# Patient Record
Sex: FEMALE | Race: WHITE | NOT HISPANIC OR LATINO | Employment: UNEMPLOYED | URBAN - METROPOLITAN AREA
[De-identification: names, ages, dates, MRNs, and addresses within clinical notes are randomized per-mention and may not be internally consistent; named-entity substitution may affect disease eponyms.]

---

## 2017-11-01 ENCOUNTER — HOSPITAL ENCOUNTER (OUTPATIENT)
Dept: FAMILY MEDICINE CLINIC | Facility: CLINIC | Age: 52
Setting detail: SPECIMEN
Discharge: HOME | End: 2017-11-01
Attending: INTERNAL MEDICINE | Admitting: INTERNAL MEDICINE

## 2017-11-01 LAB
ALBUMIN SERPL-MCNC: 3.8 G/DL (ref 3.5–4.8)
ALBUMIN/GLOB SERPL: 1.1 {RATIO} (ref 1–1.7)
ALP SERPL-CCNC: 78 IU/L (ref 32–91)
ALT SERPL-CCNC: 22 IU/L (ref 14–54)
ANION GAP SERPL CALC-SCNC: 13.5 MMOL/L (ref 10–20)
AST SERPL-CCNC: 21 IU/L (ref 15–41)
BASOPHILS # BLD AUTO: 0.1 10*3/UL (ref 0–0.2)
BASOPHILS NFR BLD AUTO: 1 % (ref 0–2)
BILIRUB SERPL-MCNC: 0.6 MG/DL (ref 0.3–1.2)
BUN SERPL-MCNC: 10 MG/DL (ref 8–20)
BUN/CREAT SERPL: 20 (ref 5.4–26.2)
CALCIUM SERPL-MCNC: 9.3 MG/DL (ref 8.9–10.3)
CHLORIDE SERPL-SCNC: 99 MMOL/L (ref 101–111)
CHOLEST SERPL-MCNC: 149 MG/DL
CHOLEST/HDLC SERPL: 3.9 {RATIO}
CONV CO2: 28 MMOL/L (ref 22–32)
CONV LDL CHOLESTEROL DIRECT: 80 MG/DL (ref 0–100)
CONV TOTAL PROTEIN: 7.4 G/DL (ref 6.1–7.9)
CREAT UR-MCNC: 0.5 MG/DL (ref 0.4–1)
DIFFERENTIAL METHOD BLD: (no result)
EOSINOPHIL # BLD AUTO: 0.1 10*3/UL (ref 0–0.3)
EOSINOPHIL # BLD AUTO: 2 % (ref 0–3)
ERYTHROCYTE [DISTWIDTH] IN BLOOD BY AUTOMATED COUNT: 13.2 % (ref 11.5–14.5)
GLOBULIN UR ELPH-MCNC: 3.6 G/DL (ref 2.5–3.8)
GLUCOSE SERPL-MCNC: 194 MG/DL (ref 65–99)
HCT VFR BLD AUTO: 36.9 % (ref 35–49)
HDLC SERPL-MCNC: 38 MG/DL
HGB BLD-MCNC: 12.5 G/DL (ref 12–15)
LDLC/HDLC SERPL: 2.1 {RATIO}
LIPID INTERPRETATION: ABNORMAL
LYMPHOCYTES # BLD AUTO: 3 10*3/UL (ref 0.8–4.8)
LYMPHOCYTES NFR BLD AUTO: 37 % (ref 18–42)
MCH RBC QN AUTO: 29.6 PG (ref 26–32)
MCHC RBC AUTO-ENTMCNC: 33.9 G/DL (ref 32–36)
MCV RBC AUTO: 87.4 FL (ref 80–94)
MONOCYTES # BLD AUTO: 0.4 10*3/UL (ref 0.1–1.3)
MONOCYTES NFR BLD AUTO: 4 % (ref 2–11)
NEUTROPHILS # BLD AUTO: 4.6 10*3/UL (ref 2.3–8.6)
NEUTROPHILS NFR BLD AUTO: 56 % (ref 50–75)
NRBC BLD AUTO-RTO: 0 /100{WBCS}
NRBC/RBC NFR BLD MANUAL: 0 10*3/UL
PLATELET # BLD AUTO: 332 10*3/UL (ref 150–450)
PMV BLD AUTO: 6.9 FL (ref 7.4–10.4)
POTASSIUM SERPL-SCNC: 4.5 MMOL/L (ref 3.6–5.1)
RBC # BLD AUTO: 4.22 10*6/UL (ref 4–5.4)
SODIUM SERPL-SCNC: 136 MMOL/L (ref 136–144)
TRIGL SERPL-MCNC: 281 MG/DL
VLDLC SERPL CALC-MCNC: 31.5 MG/DL
WBC # BLD AUTO: 8.1 10*3/UL (ref 4.5–11.5)

## 2019-08-14 RX ORDER — PEN NEEDLE, DIABETIC 32GX 5/32"
NEEDLE, DISPOSABLE MISCELLANEOUS
Qty: 50 EACH | Refills: 1 | Status: SHIPPED | OUTPATIENT
Start: 2019-08-14 | End: 2020-01-31

## 2019-11-11 RX ORDER — LISINOPRIL 10 MG/1
TABLET ORAL
Qty: 30 TABLET | Refills: 11 | Status: SHIPPED | OUTPATIENT
Start: 2019-11-11 | End: 2020-11-13

## 2019-11-11 RX ORDER — ATENOLOL 25 MG/1
TABLET ORAL
Qty: 30 TABLET | Refills: 11 | Status: SHIPPED | OUTPATIENT
Start: 2019-11-11 | End: 2020-11-13

## 2019-11-11 RX ORDER — SERTRALINE HYDROCHLORIDE 100 MG/1
TABLET, FILM COATED ORAL
Qty: 45 TABLET | Refills: 11 | Status: SHIPPED | OUTPATIENT
Start: 2019-11-11 | End: 2020-11-13

## 2020-01-03 ENCOUNTER — TELEPHONE (OUTPATIENT)
Dept: FAMILY MEDICINE CLINIC | Facility: CLINIC | Age: 55
End: 2020-01-03

## 2020-01-03 NOTE — TELEPHONE ENCOUNTER
VM MESSAGE.  Pt states her new insurance will not cover Levemir anymore, but they will cover Lantus. Asking if she can switch. Thank you.

## 2020-01-03 NOTE — TELEPHONE ENCOUNTER
Pt has not seen me or had DM labs since 11/2017--  I can not give her any advice on her meds without being seen.      Insulin dependent diabetics should be seen every 3 months so 2 years is way over the limit.  Please make an appt ASAP

## 2020-01-15 ENCOUNTER — OFFICE VISIT (OUTPATIENT)
Dept: FAMILY MEDICINE CLINIC | Facility: CLINIC | Age: 55
End: 2020-01-15

## 2020-01-15 VITALS
DIASTOLIC BLOOD PRESSURE: 92 MMHG | WEIGHT: 253.2 LBS | SYSTOLIC BLOOD PRESSURE: 202 MMHG | BODY MASS INDEX: 36.33 KG/M2 | HEART RATE: 92 BPM | TEMPERATURE: 98.2 F | RESPIRATION RATE: 16 BRPM | OXYGEN SATURATION: 96 %

## 2020-01-15 DIAGNOSIS — Z12.39 SCREENING FOR BREAST CANCER: Primary | ICD-10-CM

## 2020-01-15 DIAGNOSIS — E11.9 TYPE 2 DIABETES MELLITUS WITHOUT COMPLICATION, WITHOUT LONG-TERM CURRENT USE OF INSULIN (HCC): ICD-10-CM

## 2020-01-15 PROCEDURE — 83036 HEMOGLOBIN GLYCOSYLATED A1C: CPT | Performed by: INTERNAL MEDICINE

## 2020-01-15 PROCEDURE — 99214 OFFICE O/P EST MOD 30 MIN: CPT | Performed by: INTERNAL MEDICINE

## 2020-01-15 PROCEDURE — 80053 COMPREHEN METABOLIC PANEL: CPT | Performed by: INTERNAL MEDICINE

## 2020-01-15 RX ORDER — INSULIN DETEMIR 100 [IU]/ML
INJECTION, SOLUTION SUBCUTANEOUS
COMMUNITY
Start: 2020-01-03 | End: 2020-01-15

## 2020-01-15 NOTE — PROGRESS NOTES
Rooming Tab(CC,VS,Pt Hx,Fall Screen)  Chief Complaint   Patient presents with   • Diabetes       Subjective   Pt here for DM- had been  Out of control- out of levemir for  2 weeks-  Financially been strapped and unable to come in and get labs   increased urine output   sugars was 250 before the levemir ran out and now with  400;s   increased stress- no chestpain.  Increased gastroparesis- with higher sugars. Lots of family stress and financial stress   feet numb and tingling   I have reviewed and updated her medications, medical history and problem list during today's office visit.     Patient Care Team:  Provider, No Known as PCP - General    Problem List Tab  Medications Tab  Synopsis Tab  Chart Review Tab  Care Everywhere Tab  Immunizations Tab  Patient History Tab    Social History     Tobacco Use   • Smoking status: Never Smoker   • Smokeless tobacco: Never Used   Substance Use Topics   • Alcohol use: Not Currently       Review of Systems   Constitutional: Negative for fatigue.   HENT: Negative for congestion.    Respiratory: Negative for wheezing.    Cardiovascular: Negative for chest pain.   Neurological: Positive for weakness and numbness.   Psychiatric/Behavioral: Positive for sleep disturbance.       Objective     Rooming Tab(CC,VS,Pt Hx,Fall Screen)  BP (!) 202/92 (BP Location: Left arm, Patient Position: Sitting, Cuff Size: Adult)   Pulse 92   Temp 98.2 °F (36.8 °C) (Oral)   Resp 16   Wt 115 kg (253 lb 3.2 oz)   SpO2 96%   BMI 36.33 kg/m²     Body mass index is 36.33 kg/m².    Physical Exam   Constitutional: She is oriented to person, place, and time. She appears well-developed and well-nourished.   overweight   HENT:   Head: Normocephalic and atraumatic.   Right Ear: Tympanic membrane normal.   Left Ear: Tympanic membrane normal.   Eyes: Pupils are equal, round, and reactive to light.   Neck: Normal range of motion. Neck supple.   Cardiovascular: Normal rate and regular rhythm.   No murmur  heard.  Pulmonary/Chest: Effort normal and breath sounds normal.   Abdominal: Soft. Bowel sounds are normal. She exhibits no distension.   Neurological: She is oriented to person, place, and time.   Skin: Skin is warm. Capillary refill takes less than 2 seconds.   Psychiatric: She has a normal mood and affect.   Nursing note and vitals reviewed.       Statin Choice Calculator  Data Reviewed:    Diabetic foot exam:   Left:    Pulses Dorsalis Pedis:  present   Reflexes 2+    Vibratory sensation normal   Proprioception normal   Sharp/dull discrimination normal       Right:    Pulses Dorsalis Pedis:  present   Reflexes 2+    Vibratory sensation normal   Proprioception normal   Sharp/dull discrimination normal            Lab Results   Component Value Date    BUN 14 01/15/2020    CREATININE 0.80 01/15/2020    EGFRIFNONA 74 01/15/2020     (L) 01/15/2020    K 4.4 01/15/2020    CL 95 (L) 01/15/2020    CALCIUM 9.8 01/15/2020    ALBUMIN 4.30 01/15/2020    BILITOT 0.2 01/15/2020    ALKPHOS 97 01/15/2020    AST 22 01/15/2020    ALT 29 01/15/2020      Assessment/Plan   Order Review Tab  Health Maintenance Tab  Patient Plan/Order Tab  Diagnoses and all orders for this visit:    1. Screening for breast cancer (Primary)  -     Mammo Screening Digital Tomosynthesis Bilateral With CAD; Future    2. Type 2 diabetes mellitus without complication, without long-term current use of insulin (CMS/[facility])  Comments:  check labs- adjust meds. really needs diet and exercise. understands this  Orders:  -     Comprehensive Metabolic Panel  -     Hemoglobin A1c    Other orders  -     Insulin Glargine (LANTUS SOLOSTAR) 100 UNIT/ML injection pen; Inject 30 Units under the skin into the appropriate area as directed 2 (Two) Times a Day.  Dispense: 15 mL; Refill: 2  -     glucose blood test strip; One touch test strips use twice a day  Dispense: 100 each; Refill: 12  -     metFORMIN (GLUCOPHAGE) 1000 MG tablet; Take 1 tablet by mouth 2 (Two) Times a  Day.  Dispense: 180 tablet; Refill: 3        Wrapup Tab  Return in about 3 months (around 4/15/2020) for Annual physical.       They were informed of the diagnosis and treatment plan and directed to f/u for any further problems or concerns.

## 2020-01-16 LAB
ALBUMIN SERPL-MCNC: 4.3 G/DL (ref 3.5–5.2)
ALBUMIN/GLOB SERPL: 1.3 G/DL
ALP SERPL-CCNC: 97 U/L (ref 39–117)
ALT SERPL W P-5'-P-CCNC: 29 U/L (ref 1–33)
ANION GAP SERPL CALCULATED.3IONS-SCNC: 15.9 MMOL/L (ref 5–15)
AST SERPL-CCNC: 22 U/L (ref 1–32)
BILIRUB SERPL-MCNC: 0.2 MG/DL (ref 0.2–1.2)
BUN BLD-MCNC: 14 MG/DL (ref 6–20)
BUN/CREAT SERPL: 17.5 (ref 7–25)
CALCIUM SPEC-SCNC: 9.8 MG/DL (ref 8.6–10.5)
CHLORIDE SERPL-SCNC: 95 MMOL/L (ref 98–107)
CO2 SERPL-SCNC: 24.1 MMOL/L (ref 22–29)
CREAT BLD-MCNC: 0.8 MG/DL (ref 0.57–1)
GFR SERPL CREATININE-BSD FRML MDRD: 74 ML/MIN/1.73
GLOBULIN UR ELPH-MCNC: 3.4 GM/DL
GLUCOSE BLD-MCNC: 285 MG/DL (ref 65–99)
HBA1C MFR BLD: 10.2 % (ref 3.5–5.6)
POTASSIUM BLD-SCNC: 4.4 MMOL/L (ref 3.5–5.2)
PROT SERPL-MCNC: 7.7 G/DL (ref 6–8.5)
SODIUM BLD-SCNC: 135 MMOL/L (ref 136–145)

## 2020-01-18 NOTE — PROGRESS NOTES
Please call the patient regarding her abnormal result.suagr nearly 300 and a1c > 10.   please give pt samples for 1 month of trulicity- ( 4 pens to help get sugars better control

## 2020-01-22 ENCOUNTER — TELEPHONE (OUTPATIENT)
Dept: FAMILY MEDICINE CLINIC | Facility: CLINIC | Age: 55
End: 2020-01-22

## 2020-03-24 RX ORDER — INSULIN GLARGINE 100 [IU]/ML
INJECTION, SOLUTION SUBCUTANEOUS
Qty: 15 PEN | Refills: 1 | Status: SHIPPED | OUTPATIENT
Start: 2020-03-24 | End: 2020-05-21

## 2020-05-21 RX ORDER — INSULIN GLARGINE 100 [IU]/ML
INJECTION, SOLUTION SUBCUTANEOUS
Qty: 15 ML | Refills: 2 | Status: SHIPPED | OUTPATIENT
Start: 2020-05-21 | End: 2020-07-29

## 2020-07-29 RX ORDER — INSULIN GLARGINE 100 [IU]/ML
INJECTION, SOLUTION SUBCUTANEOUS
Qty: 15 PEN | Refills: 1 | Status: SHIPPED | OUTPATIENT
Start: 2020-07-29 | End: 2020-07-29

## 2020-07-29 RX ORDER — INSULIN GLARGINE 100 [IU]/ML
INJECTION, SOLUTION SUBCUTANEOUS
Qty: 15 PEN | Refills: 1 | Status: SHIPPED | OUTPATIENT
Start: 2020-07-29 | End: 2020-11-13

## 2020-11-13 RX ORDER — LISINOPRIL 10 MG/1
10 TABLET ORAL DAILY
Qty: 30 TABLET | Refills: 11 | Status: SHIPPED | OUTPATIENT
Start: 2020-11-13 | End: 2021-12-06

## 2020-11-13 RX ORDER — ATENOLOL 25 MG/1
25 TABLET ORAL EVERY EVENING
Qty: 30 TABLET | Refills: 11 | Status: SHIPPED | OUTPATIENT
Start: 2020-11-13 | End: 2021-11-08

## 2020-11-13 RX ORDER — INSULIN GLARGINE 100 [IU]/ML
INJECTION, SOLUTION SUBCUTANEOUS
Qty: 15 ML | Refills: 3 | Status: SHIPPED | OUTPATIENT
Start: 2020-11-13 | End: 2020-11-13 | Stop reason: SDUPTHER

## 2020-11-13 RX ORDER — SERTRALINE HYDROCHLORIDE 100 MG/1
TABLET, FILM COATED ORAL
Qty: 45 TABLET | Refills: 0 | Status: SHIPPED | OUTPATIENT
Start: 2020-11-13 | End: 2020-11-13 | Stop reason: SDUPTHER

## 2020-11-13 RX ORDER — LISINOPRIL 10 MG/1
TABLET ORAL
Qty: 30 TABLET | Refills: 0 | Status: SHIPPED | OUTPATIENT
Start: 2020-11-13 | End: 2020-11-13 | Stop reason: SDUPTHER

## 2020-11-13 RX ORDER — ATENOLOL 25 MG/1
TABLET ORAL
Qty: 30 TABLET | Refills: 0 | Status: SHIPPED | OUTPATIENT
Start: 2020-11-13 | End: 2020-11-13 | Stop reason: SDUPTHER

## 2020-11-13 RX ORDER — BLOOD SUGAR DIAGNOSTIC
STRIP MISCELLANEOUS
Qty: 200 EACH | Refills: 2 | Status: SHIPPED | OUTPATIENT
Start: 2020-11-13 | End: 2021-08-24

## 2020-11-13 RX ORDER — SERTRALINE HYDROCHLORIDE 100 MG/1
150 TABLET, FILM COATED ORAL DAILY
Qty: 45 TABLET | Refills: 11 | Status: SHIPPED | OUTPATIENT
Start: 2020-11-13 | End: 2021-02-24

## 2020-11-13 RX ORDER — INSULIN GLARGINE 100 [IU]/ML
30 INJECTION, SOLUTION SUBCUTANEOUS 2 TIMES DAILY
Qty: 15 ML | Refills: 3 | Status: SHIPPED | OUTPATIENT
Start: 2020-11-13 | End: 2021-02-19

## 2020-11-13 NOTE — TELEPHONE ENCOUNTER
PATIENT CALLING TO CHECK STATUS ON THIS REFILL REQUEST, PATIENT STATED THAT SHE HAS BEEN OUT OF MEDICATION FOR TWO DAYS, AND WOULD LIKE TO GET THIS FILLED ASAP.    PLEASE ADVISE  [phone]

## 2021-02-19 RX ORDER — INSULIN GLARGINE 100 [IU]/ML
INJECTION, SOLUTION SUBCUTANEOUS
Qty: 6 PEN | Refills: 2 | Status: SHIPPED | OUTPATIENT
Start: 2021-02-19 | End: 2021-05-11

## 2021-02-24 ENCOUNTER — OFFICE VISIT (OUTPATIENT)
Dept: FAMILY MEDICINE CLINIC | Facility: CLINIC | Age: 56
End: 2021-02-24

## 2021-02-24 ENCOUNTER — LAB (OUTPATIENT)
Dept: FAMILY MEDICINE CLINIC | Facility: CLINIC | Age: 56
End: 2021-02-24

## 2021-02-24 ENCOUNTER — PATIENT MESSAGE (OUTPATIENT)
Dept: FAMILY MEDICINE CLINIC | Facility: CLINIC | Age: 56
End: 2021-02-24

## 2021-02-24 VITALS
DIASTOLIC BLOOD PRESSURE: 81 MMHG | HEIGHT: 70 IN | TEMPERATURE: 97.3 F | SYSTOLIC BLOOD PRESSURE: 169 MMHG | HEART RATE: 71 BPM | RESPIRATION RATE: 12 BRPM | BODY MASS INDEX: 39.22 KG/M2 | OXYGEN SATURATION: 93 % | WEIGHT: 274 LBS

## 2021-02-24 DIAGNOSIS — Z00.00 ENCOUNTER FOR ANNUAL PHYSICAL EXAM: ICD-10-CM

## 2021-02-24 DIAGNOSIS — M17.0 PRIMARY OSTEOARTHRITIS OF BOTH KNEES: ICD-10-CM

## 2021-02-24 DIAGNOSIS — Z12.31 BREAST CANCER SCREENING BY MAMMOGRAM: ICD-10-CM

## 2021-02-24 DIAGNOSIS — Z01.419 GYNECOLOGIC EXAM NORMAL: ICD-10-CM

## 2021-02-24 DIAGNOSIS — E11.9 TYPE 2 DIABETES MELLITUS WITHOUT COMPLICATION, WITHOUT LONG-TERM CURRENT USE OF INSULIN (HCC): Primary | ICD-10-CM

## 2021-02-24 DIAGNOSIS — L84 CORN OF FOOT: ICD-10-CM

## 2021-02-24 PROBLEM — Z23 NEED FOR OTHER PROPHYLACTIC VACCINATION AND INOCULATION AGAINST SINGLE DISEASES: Status: ACTIVE | Noted: 2017-11-01

## 2021-02-24 PROBLEM — Z80.3 FAMILY HISTORY OF MALIGNANT NEOPLASM OF BREAST: Status: ACTIVE | Noted: 2021-02-24

## 2021-02-24 PROBLEM — Z80.1 FAMILY HISTORY OF LUNG CANCER: Status: ACTIVE | Noted: 2021-02-24

## 2021-02-24 LAB
25(OH)D3 SERPL-MCNC: 28 NG/ML
ALBUMIN SERPL-MCNC: 4.4 G/DL (ref 3.5–5.2)
ALBUMIN UR-MCNC: 2.3 MG/DL
ALBUMIN/GLOB SERPL: 1.5 G/DL
ALP SERPL-CCNC: 75 U/L (ref 39–117)
ALT SERPL W P-5'-P-CCNC: 13 U/L (ref 1–33)
ANION GAP SERPL CALCULATED.3IONS-SCNC: 11.9 MMOL/L (ref 5–15)
AST SERPL-CCNC: 14 U/L (ref 1–32)
BASOPHILS # BLD AUTO: 0.06 10*3/MM3 (ref 0–0.2)
BASOPHILS NFR BLD AUTO: 0.6 % (ref 0–1.5)
BILIRUB SERPL-MCNC: 0.4 MG/DL (ref 0–1.2)
BUN SERPL-MCNC: 10 MG/DL (ref 6–20)
BUN/CREAT SERPL: 21.7 (ref 7–25)
CALCIUM SPEC-SCNC: 9.6 MG/DL (ref 8.6–10.5)
CHLORIDE SERPL-SCNC: 101 MMOL/L (ref 98–107)
CHOLEST SERPL-MCNC: 166 MG/DL (ref 0–200)
CO2 SERPL-SCNC: 30.1 MMOL/L (ref 22–29)
CREAT SERPL-MCNC: 0.46 MG/DL (ref 0.57–1)
DEPRECATED RDW RBC AUTO: 41.2 FL (ref 37–54)
EOSINOPHIL # BLD AUTO: 0.11 10*3/MM3 (ref 0–0.4)
EOSINOPHIL NFR BLD AUTO: 1 % (ref 0.3–6.2)
ERYTHROCYTE [DISTWIDTH] IN BLOOD BY AUTOMATED COUNT: 12.8 % (ref 12.3–15.4)
GFR SERPL CREATININE-BSD FRML MDRD: 140 ML/MIN/1.73
GLOBULIN UR ELPH-MCNC: 2.9 GM/DL
GLUCOSE SERPL-MCNC: 121 MG/DL (ref 65–99)
HBA1C MFR BLD: 7.3 % (ref 3.5–5.6)
HCT VFR BLD AUTO: 37.8 % (ref 34–46.6)
HDLC SERPL-MCNC: 42 MG/DL (ref 40–60)
HGB BLD-MCNC: 12.5 G/DL (ref 12–15.9)
IMM GRANULOCYTES # BLD AUTO: 0.05 10*3/MM3 (ref 0–0.05)
IMM GRANULOCYTES NFR BLD AUTO: 0.5 % (ref 0–0.5)
LDLC SERPL CALC-MCNC: 92 MG/DL (ref 0–100)
LDLC/HDLC SERPL: 2.07 {RATIO}
LYMPHOCYTES # BLD AUTO: 2.57 10*3/MM3 (ref 0.7–3.1)
LYMPHOCYTES NFR BLD AUTO: 23.8 % (ref 19.6–45.3)
MCH RBC QN AUTO: 29.3 PG (ref 26.6–33)
MCHC RBC AUTO-ENTMCNC: 33.1 G/DL (ref 31.5–35.7)
MCV RBC AUTO: 88.5 FL (ref 79–97)
MONOCYTES # BLD AUTO: 0.59 10*3/MM3 (ref 0.1–0.9)
MONOCYTES NFR BLD AUTO: 5.5 % (ref 5–12)
NEUTROPHILS NFR BLD AUTO: 68.6 % (ref 42.7–76)
NEUTROPHILS NFR BLD AUTO: 7.42 10*3/MM3 (ref 1.7–7)
NRBC BLD AUTO-RTO: 0 /100 WBC (ref 0–0.2)
PLATELET # BLD AUTO: 438 10*3/MM3 (ref 140–450)
PMV BLD AUTO: 9 FL (ref 6–12)
POTASSIUM SERPL-SCNC: 4.6 MMOL/L (ref 3.5–5.2)
PROT SERPL-MCNC: 7.3 G/DL (ref 6–8.5)
RBC # BLD AUTO: 4.27 10*6/MM3 (ref 3.77–5.28)
SODIUM SERPL-SCNC: 143 MMOL/L (ref 136–145)
TRIGL SERPL-MCNC: 186 MG/DL (ref 0–150)
TSH SERPL DL<=0.05 MIU/L-ACNC: 1.01 UIU/ML (ref 0.27–4.2)
VLDLC SERPL-MCNC: 32 MG/DL (ref 5–40)
WBC # BLD AUTO: 10.8 10*3/MM3 (ref 3.4–10.8)

## 2021-02-24 PROCEDURE — 82043 UR ALBUMIN QUANTITATIVE: CPT | Performed by: INTERNAL MEDICINE

## 2021-02-24 PROCEDURE — 99396 PREV VISIT EST AGE 40-64: CPT | Performed by: INTERNAL MEDICINE

## 2021-02-24 PROCEDURE — 85025 COMPLETE CBC W/AUTO DIFF WBC: CPT | Performed by: INTERNAL MEDICINE

## 2021-02-24 PROCEDURE — 80061 LIPID PANEL: CPT | Performed by: INTERNAL MEDICINE

## 2021-02-24 PROCEDURE — 83036 HEMOGLOBIN GLYCOSYLATED A1C: CPT | Performed by: INTERNAL MEDICINE

## 2021-02-24 PROCEDURE — 82306 VITAMIN D 25 HYDROXY: CPT | Performed by: INTERNAL MEDICINE

## 2021-02-24 PROCEDURE — 80053 COMPREHEN METABOLIC PANEL: CPT | Performed by: INTERNAL MEDICINE

## 2021-02-24 PROCEDURE — 84443 ASSAY THYROID STIM HORMONE: CPT | Performed by: INTERNAL MEDICINE

## 2021-02-24 RX ORDER — SERTRALINE HYDROCHLORIDE 100 MG/1
200 TABLET, FILM COATED ORAL DAILY
Qty: 180 TABLET | Refills: 3 | Status: SHIPPED | OUTPATIENT
Start: 2021-02-24 | End: 2022-03-01

## 2021-02-24 NOTE — PROGRESS NOTES
"Rooming Tab(CC,VS,Pt Hx,Fall Screen)  Chief Complaint   Patient presents with   • Annual Exam       Subjective    Pt here for annual exam- doing ok.  Been social distancing for 1 year. Daughter back in [place]- tolerating mask.  to get his first shot this weekend.   DM still high- admits hard to stay on diet she needs to- since holidays getting better this morning was 146- yesterday 106 in am- back on insulin again- can't exercise as knees hurting all the time- hurts to even walk in the stores.   no pain with intercourse- no issues with breast. + yeast- using corn starch as needed.   no moles changing    no chest pain  But has GERD- using gingerale when needed.   has cpap- she can't get to sleep because of thinkign and pain  I have reviewed and updated her medications, medical history and problem list during today's office visit.     Patient Care Team:  Britney Villeda MD as PCP - Internal Medicine (Internal Medicine & Pediatrics)    Problem List Tab  Medications Tab  Synopsis Tab  Chart Review Tab  Care Everywhere Tab  Immunizations Tab  Patient History Tab    Social History     Tobacco Use   • Smoking status: Never Smoker   • Smokeless tobacco: Never Used   Substance Use Topics   • Alcohol use: Not Currently       Review of Systems  Answers for HPI/ROS submitted by the patient on 2/24/2021   What is the primary reason for your visit?: Physical    Objective     Rooming Tab(CC,VS,Pt Hx,Fall Screen)  /81   Pulse 71   Temp 97.3 °F (36.3 °C)   Resp 12   Ht 177.8 cm (70\")   Wt 124 kg (274 lb)   SpO2 93%   BMI 39.31 kg/m²     Body mass index is 39.31 kg/m².    Physical Exam  Vitals signs and nursing note reviewed.   Constitutional:       Appearance: Normal appearance. She is well-developed. She is obese.   HENT:      Head: Normocephalic and atraumatic.      Right Ear: Tympanic membrane and external ear normal.      Left Ear: Tympanic membrane and external ear normal.      Nose: No " rhinorrhea.      Mouth/Throat:      Pharynx: No posterior oropharyngeal erythema.   Eyes:      Conjunctiva/sclera: Conjunctivae normal.      Pupils: Pupils are equal, round, and reactive to light.   Neck:      Musculoskeletal: Normal range of motion and neck supple.   Cardiovascular:      Rate and Rhythm: Normal rate and regular rhythm.      Pulses: Normal pulses.      Heart sounds: Normal heart sounds. No murmur.   Pulmonary:      Effort: Pulmonary effort is normal.      Breath sounds: Normal breath sounds.   Chest:      Breasts:         Right: No inverted nipple, mass or tenderness.         Left: No inverted nipple, mass or tenderness.   Abdominal:      General: Bowel sounds are normal. There is no distension.      Palpations: Abdomen is soft.      Tenderness: There is no abdominal tenderness.   Genitourinary:     Labia:         Right: No rash, tenderness or lesion.         Left: No lesion.       Cervix: Erythema present. No cervical motion tenderness.      Uterus: Not enlarged.       Adnexa:         Right: No fullness.          Left: No fullness.        Rectum: No mass.      Comments: Large skin tag right groin   blood specs with exam  Musculoskeletal:         General: No tenderness.   Skin:     Capillary Refill: Capillary refill takes less than 2 seconds.      Findings: No erythema.      Comments: Large corn with swelling at ball of right foot.   Neurological:      General: No focal deficit present.      Mental Status: She is alert and oriented to person, place, and time.   Psychiatric:         Mood and Affect: Mood normal.         Behavior: Behavior normal.          Statin Choice Calculator  Data Reviewed:               Lab Results   Component Value Date    BUN 10 02/24/2021    CREATININE 0.46 (L) 02/24/2021    EGFRIFNONA 140 02/24/2021     02/24/2021    K 4.6 02/24/2021     02/24/2021    CALCIUM 9.6 02/24/2021    ALBUMIN 4.40 02/24/2021    BILITOT 0.4 02/24/2021    ALKPHOS 75 02/24/2021    AST 14  02/24/2021    ALT 13 02/24/2021    TRIG 186 (H) 02/24/2021    HDL 42 02/24/2021    VLDL 32 02/24/2021    LDL 92 02/24/2021    LDLHDL 2.07 02/24/2021    MICROALBUR 2.3 02/24/2021    WBC 10.80 02/24/2021    RBC 4.27 02/24/2021    HCT 37.8 02/24/2021    MCV 88.5 02/24/2021    MCH 29.3 02/24/2021    TSH 1.010 02/24/2021    KZGS79MT 28.0 02/24/2021      Assessment/Plan   Order Review Tab  Health Maintenance Tab  Patient Plan/Order Tab  Diagnoses and all orders for this visit:    1. Type 2 diabetes mellitus without complication, without long-term current use of insulin (CMS/[facility]) (Primary)  Comments:  beter control with current regimen. needs eye exam  Orders:  -     Comprehensive Metabolic Panel  -     Hemoglobin A1c  -     MicroAlbumin, Urine, Random - Urine, Clean Catch    2. Encounter for annual physical exam  -     IGP,Aptima HPV,Age Gdln; Future  -     CBC Auto Differential  -     Lipid Panel  -     TSH  -     Vitamin D 25 Hydroxy    3. Breast cancer screening by mammogram  -     Mammo Screening Digital Tomosynthesis Bilateral With CAD; Future    4. Primary osteoarthritis of both knees  -     XR Knee 1 or 2 View Bilateral; Future    5. Corn of foot  Comments:  refer to podiatry  Orders:  -     Cancel: Ambulatory Referral to Podiatry  -     Ambulatory Referral to Podiatry    6. Gynecologic exam normal  Assessment & Plan:  postmenopausal      Other orders  -     sertraline (Zoloft) 100 MG tablet; Take 2 tablets by mouth Daily.  Dispense: 180 tablet; Refill: 3      Wrapup Tab  Return in about 3 months (around 5/24/2021), or if symptoms worsen or fail to improve.           During this visit for their annual exam, we reviewed their personal history, social history and family history.  We went over their medications and all the recommended health maintenence items for their age group. They were given the opportunity to ask questions and discuss other concerns.

## 2021-02-25 RX ORDER — SULFAMETHOXAZOLE AND TRIMETHOPRIM 800; 160 MG/1; MG/1
1 TABLET ORAL 2 TIMES DAILY
Qty: 20 TABLET | Refills: 0 | Status: SHIPPED | OUTPATIENT
Start: 2021-02-25 | End: 2021-03-22

## 2021-02-25 NOTE — TELEPHONE ENCOUNTER
From: Lupe Bridges  To: Britney Villeda MD  Sent: 2/24/2021 11:22 PM EST  Subject: Visit Follow-Up Question    Dr. Villeda,  We discuss my sinus problem and then we got off onto another problem I was having. Should I just continue with the tylenol sinus otc I have been taking or was there something else I could take?  Soha Bridges

## 2021-02-25 NOTE — PROGRESS NOTES
a1c looks GREAT!  Good job glucose was down to 121.  vD is low- make sure taking at least 2000 units of vd3 daily- many need [id number] in the winter

## 2021-02-27 PROBLEM — Z01.419 GYNECOLOGIC EXAM NORMAL: Status: ACTIVE | Noted: 2021-02-27

## 2021-02-27 LAB
AGE GDLN ACOG TESTING: NORMAL
CYTOLOGIST CVX/VAG CYTO: NORMAL
CYTOLOGY CVX/VAG DOC CYTO: NORMAL
CYTOLOGY CVX/VAG DOC THIN PREP: NORMAL
DX ICD CODE: NORMAL
HIV 1 & 2 AB SER-IMP: NORMAL
HPV I/H RISK 4 DNA CVX QL PROBE+SIG AMP: NEGATIVE
OTHER STN SPEC: NORMAL
STAT OF ADQ CVX/VAG CYTO-IMP: NORMAL

## 2021-03-12 ENCOUNTER — HOSPITAL ENCOUNTER (OUTPATIENT)
Dept: GENERAL RADIOLOGY | Facility: HOSPITAL | Age: 56
Discharge: HOME | End: 2021-03-12

## 2021-03-12 ENCOUNTER — HOSPITAL ENCOUNTER (OUTPATIENT)
Dept: MAMMOGRAPHY | Facility: HOSPITAL | Age: 56
Discharge: HOME | End: 2021-03-12

## 2021-03-12 DIAGNOSIS — M17.0 PRIMARY OSTEOARTHRITIS OF BOTH KNEES: ICD-10-CM

## 2021-03-12 DIAGNOSIS — Z12.31 BREAST CANCER SCREENING BY MAMMOGRAM: ICD-10-CM

## 2021-03-12 PROCEDURE — 73560 X-RAY EXAM OF KNEE 1 OR 2: CPT

## 2021-03-12 PROCEDURE — 77063 BREAST TOMOSYNTHESIS BI: CPT

## 2021-03-12 PROCEDURE — 77067 SCR MAMMO BI INCL CAD: CPT

## 2021-03-12 NOTE — PROGRESS NOTES
Both knees shows significant arthritis worse in the medial aspects. The left is worse than the right and nearly needing a replacement.  Let me know when you are ready and if you have a specific orthopedist in mind.

## 2021-03-22 ENCOUNTER — OFFICE VISIT (OUTPATIENT)
Dept: PODIATRY | Facility: CLINIC | Age: 56
End: 2021-03-22

## 2021-03-22 VITALS
WEIGHT: 276 LBS | HEART RATE: 65 BPM | BODY MASS INDEX: 39.51 KG/M2 | HEIGHT: 70 IN | SYSTOLIC BLOOD PRESSURE: 158 MMHG | DIASTOLIC BLOOD PRESSURE: 81 MMHG

## 2021-03-22 DIAGNOSIS — L84 CALLUS OF FOOT: Primary | ICD-10-CM

## 2021-03-22 DIAGNOSIS — M77.41 METATARSALGIA OF RIGHT FOOT: ICD-10-CM

## 2021-03-22 DIAGNOSIS — E11.65 TYPE 2 DIABETES MELLITUS WITH HYPERGLYCEMIA, WITHOUT LONG-TERM CURRENT USE OF INSULIN (HCC): ICD-10-CM

## 2021-03-22 PROCEDURE — 99203 OFFICE O/P NEW LOW 30 MIN: CPT | Performed by: PODIATRIST

## 2021-03-23 NOTE — PROGRESS NOTES
2021  Foot and Ankle Surgery - New Patient   Provider: Dr. Alexander Dow DPM  Location: [facility]    Subjective:  Lupe Bridges is a 55 y.o. female.     Chief Complaint   Patient presents with   • Right Foot - Pain, Callouses       HPI: Patient is a 55-year-old diabetic female that was referred to me for moderate callus involving the plantar aspect of her right foot and diabetic foot check.  She states that she is doing relatively well and she does not have any substantial issues.  She does have mild discomfort involving the plantar aspect of the right foot at times.  She denies any previous open wounds or infections to her feet.  She has tried to manage the callus in the past without any significant improvement.  She does not have any prominent numbness or tingling to her feet.  She has not seen a podiatrist in the past.  No other issues today    Allergies   Allergen Reactions   • Levofloxacin Nausea And Vomiting   • Penicillins Nausea And Vomiting       Past Medical History:   Diagnosis Date   • Anemia    • Ankle sprain    • Anxiety    • Arrhythmia    • Bronchitis, acute    • Chest pain    • Colitis    • Diabetes mellitus, type II (CMS/HCC)    • Hypertension    • Obesity    • RAD (reactive airway disease)    • Shortness of breath    • Stress disorder, acute        Past Surgical History:   Procedure Laterality Date   •  SECTION         Family History   Problem Relation Age of Onset   • Breast cancer Sister    • Uterine cancer Sister    • Lung cancer Other         MESOTHIOLOMA   • Brain cancer Other         MET FROM LUNG   • Breast cancer Other    • Breast cancer Sister    • Uterine cancer Sister    • Uterine cancer Sister        Social History     Socioeconomic History   • Marital status:      Spouse name: Not on file   • Number of children: Not on file   • Years of education: Not on file   • Highest education level: Not on file   Tobacco Use   • Smoking status: Never Smoker  "  • Smokeless tobacco: Never Used   Vaping Use   • Vaping Use: Never used   Substance and Sexual Activity   • Alcohol use: Not Currently   • Drug use: Not Currently   • Sexual activity: Defer        Current Outpatient Medications on File Prior to Visit   Medication Sig Dispense Refill   • atenolol (TENORMIN) 25 MG tablet Take 1 tablet by mouth Every Evening. 30 tablet 11   • glucose blood (Accu-Chek Guide) test strip DX E 11.9 test tid 200 each 2   • Lantus SoloStar 100 UNIT/ML injection pen INJECT 30 UNITS UNDER THE SKIN TWO TIMES A DAY 6 pen 2   • lisinopril (PRINIVIL,ZESTRIL) 10 MG tablet Take 1 tablet by mouth Daily. 30 tablet 11   • metFORMIN (GLUCOPHAGE) 1000 MG tablet TAKE ONE TABLET BY MOUTH TWICE A  tablet 2   • sertraline (Zoloft) 100 MG tablet Take 2 tablets by mouth Daily. 180 tablet 3     No current facility-administered medications on file prior to visit.       Review of Systems:  General: Denies fever, chills, fatigue, and weakness.  Eyes: Denies vision loss, blurry vision, and excessive redness.  ENT: Denies hearing issues and difficulty swallowing.  Cardiovascular: Denies palpitations, chest pain, or syncopal episodes.  Respiratory: Denies shortness of breath, wheezing, and coughing.  GI: Denies abdominal pain, nausea, and vomiting.   : Denies frequency, hematuria, and urgency.  Musculoskeletal: Denies muscle cramps, joint pains, and stiffness.  Derm: + Callus formation to the right foot  Neuro: Denies headaches, numbness, loss of coordination, and tremors.  Psych: Denies anxiety and depression.  Endocrine: Denies temperature intolerance and changes in appetite.  Heme: Denies bleeding disorders or abnormal bruising.     Objective   /81   Pulse 65   Ht 177.8 cm (70\")   Wt 125 kg (276 lb)   BMI 39.60 kg/m²     Foot/Ankle Exam:       General:   Appearance: appears stated age and healthy    Orientation: AAOx3    Affect: appropriate    Gait: unimpaired      VASCULAR      Right Foot " Vascularity   Normal vascular exam    Dorsalis pedis:  2+  Posterior tibial:  2+  Skin Temperature: warm    Edema Grading:  None  CFT:  < 3 seconds  Pedal Hair Growth:  Present  Varicosities: none       Left Foot Vascularity   Normal vascular exam    Dorsalis pedis:  2+  Posterior tibial:  2+  Skin Temperature: warm    Edema Grading:  None  CFT:  < 3 seconds  Pedal Hair Growth:  Present  Varicosities: none        NEUROLOGIC     Right Foot Neurologic   Light touch sensation:  Normal  Hot/Cold sensation: normal    Protective Sensation using [place] Monofilament:  10  Achilles reflex:  2+     Left Foot Neurologic   Light touch sensation:  Normal  Hot/cold sensation: normal    Protective Sensation using [place] Monofilament:  10  Achilles reflex:  2+     MUSCULOSKELETAL      Right Foot Musculoskeletal   Ecchymosis:  None  Tenderness: lesser metatarsophalangeal joints and right foot callus    Arch:  Normal     Left Foot Musculoskeletal   Ecchymosis:  None  Tenderness: none    Arch:  Normal     MUSCLE STRENGTH     Right Foot Muscle Strength   Normal strength    Foot dorsiflexion:  5  Foot plantar flexion:  5  Foot inversion:  5  Foot eversion:  5     Left Foot Muscle Strength   Normal strength    Foot dorsiflexion:  5  Foot plantar flexion:  5  Foot inversion:  5  Foot eversion:  5     RANGE OF MOTION      Right Foot Range of Motion   Foot and ankle ROM within normal limits       Left Foot Range of Motion   Foot and ankle ROM within normal limits       DERMATOLOGIC     Right Foot Dermatologic   Skin: corn    Skin: no right foot cellulitis and no right foot ulcer       Left Foot Dermatologic   Skin: skin intact       TESTS     Right Foot Tests   Anterior drawer: negative    Varus tilt: negative       Left Foot Tests   Anterior drawer: negative    Varus tilt: negative       Image:       Right Foot Additional Comments No gross deformity or instability      Assessment/Plan   Diagnoses and all orders for this  visit:    1. Callus of foot (Primary)  -     XR Foot 3+ View Right    2. Metatarsalgia of right foot    3. Type 2 diabetes mellitus with hyperglycemia, without long-term current use of insulin (CMS/[facility])      Patient was referred to me for routine diabetic foot check and evaluation of the plantar callus on the right foot.  She does have moderate callus formation but no underlying open wound or infection.  Overall, I feel that she is at mild risk of pedal complications.  Her most recent A1c was appropriate and I have asked that she continue to work towards glycemic control.  I have asked that she start daily foot checks and application of a diabetic foot cream to help the callus from recurring.  Patient is to monitor closely and call with any new issues or concerns.  I would like to see her in 6 months for reevaluation.    Orders Placed This Encounter   Procedures   • XR Foot 3+ View Right     Weight Bearing     Order Specific Question:   Reason for Exam:     Answer:   Right foot pain she has a callus under the toes RM 15 WB        Note is dictated utilizing voice recognition software. Unfortunately this leads to occasional typographical errors. I apologize in advance if the situation occurs. If questions occur please do not hesitate to call our office.

## 2021-03-23 NOTE — PATIENT INSTRUCTIONS
Diabetes Mellitus and Foot Care  Foot care is an important part of your health, especially when you have diabetes. Diabetes may cause you to have problems because of poor blood flow (circulation) to your feet and legs, which can cause your skin to:  · Become thinner and drier.  · Break more easily.  · Heal more slowly.  · Peel and crack.  You may also have nerve damage (neuropathy) in your legs and feet, causing decreased feeling in them. This means that you may not notice minor injuries to your feet that could lead to more serious problems. Noticing and addressing any potential problems early is the best way to prevent future foot problems.  How to care for your feet  Foot hygiene  · Wash your feet daily with warm water and mild soap. Do not use hot water. Then, pat your feet and the areas between your toes until they are completely dry. Do not soak your feet as this can dry your skin.  · Trim your toenails straight across. Do not dig under them or around the cuticle. File the edges of your nails with an emery board or nail file.  · Apply a moisturizing lotion or petroleum jelly to the skin on your feet and to dry, brittle toenails. Use lotion that does not contain alcohol and is unscented. Do not apply lotion between your toes.  Shoes and socks  · Wear clean socks or stockings every day. Make sure they are not too tight. Do not wear knee-high stockings since they may decrease blood flow to your legs.  · Wear shoes that fit properly and have enough cushioning. Always look in your shoes before you put them on to be sure there are no objects inside.  · To break in new shoes, wear them for just a few hours a day. This prevents injuries on your feet.  Wounds, scrapes, corns, and calluses  · Check your feet daily for blisters, cuts, bruises, sores, and redness. If you cannot see the bottom of your feet, use a mirror or ask someone for help.  · Do not cut corns or calluses or try to remove them with medicine.  · If you  find a minor scrape, cut, or break in the skin on your feet, keep it and the skin around it clean and dry. You may clean these areas with mild soap and water. Do not clean the area with peroxide, alcohol, or iodine.  · If you have a wound, scrape, corn, or callus on your foot, look at it several times a day to make sure it is healing and not infected. Check for:  ? Redness, swelling, or pain.  ? Fluid or blood.  ? Warmth.  ? Pus or a bad smell.  General instructions  · Do not cross your legs. This may decrease blood flow to your feet.  · Do not use heating pads or hot water bottles on your feet. They may burn your skin. If you have lost feeling in your feet or legs, you may not know this is happening until it is too late.  · Protect your feet from hot and cold by wearing shoes, such as at the beach or on hot pavement.  · Schedule a complete foot exam at least once a year (annually) or more often if you have foot problems. If you have foot problems, report any cuts, sores, or bruises to your health care provider immediately.  Contact a health care provider if:  · You have a medical condition that increases your risk of infection and you have any cuts, sores, or bruises on your feet.  · You have an injury that is not healing.  · You have redness on your legs or feet.  · You feel burning or tingling in your legs or feet.  · You have pain or cramps in your legs and feet.  · Your legs or feet are numb.  · Your feet always feel cold.  · You have pain around a toenail.  Get help right away if:  · You have a wound, scrape, corn, or callus on your foot and:  ? You have pain, swelling, or redness that gets worse.  ? You have fluid or blood coming from the wound, scrape, corn, or callus.  ? Your wound, scrape, corn, or callus feels warm to the touch.  ? You have pus or a bad smell coming from the wound, scrape, corn, or callus.  ? You have a fever.  ? You have a red line going up your leg.  Summary  · Check your feet every day  for cuts, sores, red spots, swelling, and blisters.  · Moisturize feet and legs daily.  · Wear shoes that fit properly and have enough cushioning.  · If you have foot problems, report any cuts, sores, or bruises to your health care provider immediately.  · Schedule a complete foot exam at least once a year (annually) or more often if you have foot problems.  This information is not intended to replace advice given to you by your health care provider. Make sure you discuss any questions you have with your health care provider.  Document Revised: 09/09/2020 Document Reviewed: 01/19/2018  [employer]vier Patient Education © 2021 [employer]

## 2021-04-23 ENCOUNTER — TELEPHONE (OUTPATIENT)
Dept: ORTHOPEDIC SURGERY | Facility: CLINIC | Age: 56
End: 2021-04-23

## 2021-05-11 RX ORDER — INSULIN GLARGINE 100 [IU]/ML
INJECTION, SOLUTION SUBCUTANEOUS
Qty: 15 PEN | Refills: 1 | Status: SHIPPED | OUTPATIENT
Start: 2021-05-11 | End: 2021-06-30

## 2021-06-30 RX ORDER — INSULIN GLARGINE 100 [IU]/ML
INJECTION, SOLUTION SUBCUTANEOUS
Qty: 15 PEN | Refills: 4 | Status: SHIPPED | OUTPATIENT
Start: 2021-06-30 | End: 2021-08-25 | Stop reason: SDUPTHER

## 2021-08-24 RX ORDER — BLOOD SUGAR DIAGNOSTIC
STRIP MISCELLANEOUS
Qty: 200 EACH | Refills: 1 | Status: SHIPPED | OUTPATIENT
Start: 2021-08-24 | End: 2022-07-17 | Stop reason: SDUPTHER

## 2021-08-25 ENCOUNTER — OFFICE VISIT (OUTPATIENT)
Dept: FAMILY MEDICINE CLINIC | Facility: CLINIC | Age: 56
End: 2021-08-25

## 2021-08-25 ENCOUNTER — LAB (OUTPATIENT)
Dept: FAMILY MEDICINE CLINIC | Facility: CLINIC | Age: 56
End: 2021-08-25

## 2021-08-25 VITALS
DIASTOLIC BLOOD PRESSURE: 70 MMHG | HEIGHT: 70 IN | OXYGEN SATURATION: 96 % | SYSTOLIC BLOOD PRESSURE: 144 MMHG | HEART RATE: 67 BPM | TEMPERATURE: 98.6 F | BODY MASS INDEX: 39.48 KG/M2 | RESPIRATION RATE: 18 BRPM | WEIGHT: 275.8 LBS

## 2021-08-25 DIAGNOSIS — E11.9 TYPE 2 DIABETES MELLITUS WITHOUT COMPLICATION, WITHOUT LONG-TERM CURRENT USE OF INSULIN (HCC): Primary | ICD-10-CM

## 2021-08-25 DIAGNOSIS — J01.00 ACUTE NON-RECURRENT MAXILLARY SINUSITIS: ICD-10-CM

## 2021-08-25 DIAGNOSIS — I10 ESSENTIAL HYPERTENSION: ICD-10-CM

## 2021-08-25 DIAGNOSIS — M19.90 ARTHRITIS: ICD-10-CM

## 2021-08-25 DIAGNOSIS — F33.1 MAJOR DEPRESSIVE DISORDER, RECURRENT EPISODE, MODERATE DEGREE (HCC): ICD-10-CM

## 2021-08-25 LAB
ALBUMIN SERPL-MCNC: 4.1 G/DL (ref 3.5–5.2)
ALBUMIN/GLOB SERPL: 1.3 G/DL
ALP SERPL-CCNC: 89 U/L (ref 39–117)
ALT SERPL W P-5'-P-CCNC: 14 U/L (ref 1–33)
ANION GAP SERPL CALCULATED.3IONS-SCNC: 10.9 MMOL/L (ref 5–15)
AST SERPL-CCNC: 13 U/L (ref 1–32)
BILIRUB SERPL-MCNC: 0.3 MG/DL (ref 0–1.2)
BUN SERPL-MCNC: 10 MG/DL (ref 6–20)
BUN/CREAT SERPL: 17.9 (ref 7–25)
CALCIUM SPEC-SCNC: 9.1 MG/DL (ref 8.6–10.5)
CHLORIDE SERPL-SCNC: 98 MMOL/L (ref 98–107)
CO2 SERPL-SCNC: 31.1 MMOL/L (ref 22–29)
CREAT SERPL-MCNC: 0.56 MG/DL (ref 0.57–1)
GFR SERPL CREATININE-BSD FRML MDRD: 111 ML/MIN/1.73
GLOBULIN UR ELPH-MCNC: 3.1 GM/DL
GLUCOSE SERPL-MCNC: 131 MG/DL (ref 65–99)
HBA1C MFR BLD: 8.1 % (ref 3.5–5.6)
POTASSIUM SERPL-SCNC: 4 MMOL/L (ref 3.5–5.2)
PROT SERPL-MCNC: 7.2 G/DL (ref 6–8.5)
SODIUM SERPL-SCNC: 140 MMOL/L (ref 136–145)

## 2021-08-25 PROCEDURE — 83036 HEMOGLOBIN GLYCOSYLATED A1C: CPT | Performed by: INTERNAL MEDICINE

## 2021-08-25 PROCEDURE — 80053 COMPREHEN METABOLIC PANEL: CPT | Performed by: INTERNAL MEDICINE

## 2021-08-25 PROCEDURE — 99214 OFFICE O/P EST MOD 30 MIN: CPT | Performed by: INTERNAL MEDICINE

## 2021-08-25 PROCEDURE — 36415 COLL VENOUS BLD VENIPUNCTURE: CPT | Performed by: INTERNAL MEDICINE

## 2021-08-25 RX ORDER — INSULIN GLARGINE 100 [IU]/ML
40 INJECTION, SOLUTION SUBCUTANEOUS 2 TIMES DAILY
Qty: 18 PEN | Refills: 4 | Status: SHIPPED | OUTPATIENT
Start: 2021-08-25 | End: 2022-03-20

## 2021-08-25 RX ORDER — MELOXICAM 15 MG/1
15 TABLET ORAL DAILY
Qty: 30 TABLET | Refills: 3 | Status: SHIPPED | OUTPATIENT
Start: 2021-08-25 | End: 2022-03-20

## 2021-08-25 NOTE — PROGRESS NOTES
"Rooming Tab(CC,VS,Pt Hx,Fall Screen)  Chief Complaint   Patient presents with   • Diabetes   • Hypertension   • Anxiety       Subjective   Pt here for several issues-  1. Knees increased pain- taking 4 motrin at night to sleep  And helps but now with GERD at night   2. Arthritis in feet- got inserts and helping-  3. Increased stress- Les and ji very overwhelmed at times  4. Right sinus issues comes and goes and   Fluid in ears to make her dizzy-   no  Loss of taste/smell no fever no cough  5. Sugars up- over 200 was 270 Monday fasting-  Lowest 120 on 8/5.     I have reviewed and updated her medications, medical history and problem list during today's office visit.     Patient Care Team:  Britney Villeda MD as PCP - General (Internal Medicine)  Britney Villeda MD as PCP - Internal Medicine (Internal Medicine & Pediatrics)    Problem List Tab  Medications Tab  Synopsis Tab  Chart Review Tab  Care Everywhere Tab  Immunizations Tab  Patient History Tab    Social History     Tobacco Use   • Smoking status: Former Smoker     Packs/day: 3.00     Years: 20.00     Pack years: 60.00     Types: Cigarettes   • Smokeless tobacco: Never Used   Substance Use Topics   • Alcohol use: Not Currently     Alcohol/week: 0.0 standard drinks       Review of Systems    Objective     Rooming Tab(CC,VS,Pt Hx,Fall Screen)  /70 (BP Location: Left arm, Patient Position: Sitting, Cuff Size: Adult)   Pulse 67   Temp 98.6 °F (37 °C) (Oral)   Resp 18   Ht 177.8 cm (70\")   Wt 125 kg (275 lb 12.8 oz)   SpO2 96%   BMI 39.57 kg/m²     Body mass index is 39.57 kg/m².    Physical Exam  Vitals and nursing note reviewed.   Constitutional:       Appearance: Normal appearance. She is well-developed. She is obese.   HENT:      Head: Normocephalic and atraumatic.      Right Ear: Tympanic membrane normal.      Left Ear: Tympanic membrane normal.      Nose: Congestion and rhinorrhea present.      Mouth/Throat:      Pharynx: " Posterior oropharyngeal erythema present.   Eyes:      Pupils: Pupils are equal, round, and reactive to light.   Cardiovascular:      Rate and Rhythm: Normal rate and regular rhythm.      Pulses: Normal pulses.      Heart sounds: Normal heart sounds. No murmur heard.     Pulmonary:      Effort: Pulmonary effort is normal.      Breath sounds: Normal breath sounds.   Abdominal:      General: Bowel sounds are normal. There is no distension.      Palpations: Abdomen is soft.   Musculoskeletal:         General: Tenderness present.      Cervical back: Normal range of motion and neck supple.   Skin:     Capillary Refill: Capillary refill takes less than 2 seconds.   Neurological:      Mental Status: She is alert and oriented to person, place, and time.   Psychiatric:         Mood and Affect: Mood normal.         Behavior: Behavior normal.          Statin Choice Calculator  Data Reviewed:               Lab Results   Component Value Date    BUN 10 08/25/2021    CREATININE 0.56 (L) 08/25/2021    EGFRIFNONA 111 08/25/2021     08/25/2021    K 4.0 08/25/2021    CL 98 08/25/2021    CALCIUM 9.1 08/25/2021    ALBUMIN 4.10 08/25/2021    BILITOT 0.3 08/25/2021    ALKPHOS 89 08/25/2021    AST 13 08/25/2021    ALT 14 08/25/2021      Assessment/Plan   Order Review Tab  Health Maintenance Tab  Patient Plan/Order Tab  Diagnoses and all orders for this visit:    1. Type 2 diabetes mellitus without complication, without long-term current use of insulin (CMS/[facility]) (Primary)  -     Comprehensive Metabolic Panel  -     Hemoglobin A1c    2. Essential hypertension    3. Major depressive disorder, recurrent episode, moderate degree (CMS/[facility])  Assessment & Plan:  Patient's depression is recurrent and is moderate without psychosis. Their depression is currently active and the condition is worsening. This will be reassessed at the next regular appointment. F/U as described:patient was prescribed an antidepressant medicine. Add in wellbutrin  with zoloft-      4. Arthritis  Assessment & Plan:    Start mobic- no motrin- and will think about injection I  future      5. Acute non-recurrent maxillary sinusitis  Comments:  allergy induced- no abx at this time-   flonase    Other orders  -     Cancel: Hemoglobin A1c; Future  -     Insulin Glargine (Lantus SoloStar) 100 UNIT/ML injection pen; Inject 40 Units under the skin into the appropriate area as directed 2 (Two) Times a Day.  Dispense: 18 pen; Refill: 4  -     meloxicam (Mobic) 15 MG tablet; Take 1 tablet by mouth Daily.  Dispense: 30 tablet; Refill: 3      Wrapup Tab  Return if symptoms worsen or fail to improve.       They were informed of the diagnosis and treatment plan and directed to f/u for any further problems or concerns.                  Answers for HPI/ROS submitted by the patient on 8/24/2021  What is the primary reason for your visit?: Diabetes  Diabetes type: type 2  MedicAlert ID: No  Disease duration: 15 years  foot paresthesias: Yes  foot ulcerations: No  visual change: No  Symptom course: stable  headaches: Yes  hunger: No  mood changes: Yes  sleepiness: Yes  sweats: Yes  blackouts: No  hospitalization: No  nocturnal hypoglycemia: No  required assistance: No  required glucagon: No  CVA: No  heart disease: No  impotence: No  nephropathy: No  peripheral neuropathy: No  PVD: No  retinopathy: No  CAD risks: family history, hypertension, obesity, sedentary lifestyle, stress  Current treatments: insulin injections, oral agent (monotherapy)  Treatment compliance: most of the time  Dose schedule: pre-breakfast, at bedtime  Given by: patient  Injection sites: abdominal wall  Home blood tests: 1-2 x per day  Monitoring compliance: excellent  Blood glucose trend: fluctuating minimally  breakfast time: 8-9 am  breakfast glucose level: 140-180  dinner time: after 8 pm  dinner glucose level: 180-200  High score: >200  Overall: 180-200  Weight trend: fluctuating minimally  Current diet: generally  healthy  Meal planning: avoidance of concentrated sweets  Exercise: rarely  Dietitian visit: No  Eye exam current: No  Sees podiatrist: Yes

## 2021-08-25 NOTE — ASSESSMENT & PLAN NOTE
Patient's depression is recurrent and is moderate without psychosis. Their depression is currently active and the condition is worsening. This will be reassessed at the next regular appointment. F/U as described:patient was prescribed an antidepressant medicine. Add in wellbutrin with zoloft-

## 2021-08-26 NOTE — PROGRESS NOTES
A1c has increased to 8.1- work on trying to get some exercise and increase the lantus to 45 units please

## 2021-10-12 NOTE — TELEPHONE ENCOUNTER
Caller: Lupe Bridges    Relationship: Self      Medication requested (name and dosage): metFORMIN (GLUCOPHAGE) 1000 MG tablet    Pharmacy where request should be sent: [facility], [place]     Additional details provided by patient:  NEEDS SENT IN     Best call back number: [phone]    Does the patient have less than a 3 day supply:  [x] Yes  [] No    Karely Burger   10/12/21 15:11 EDT

## 2021-11-08 RX ORDER — ATENOLOL 25 MG/1
TABLET ORAL
Qty: 30 TABLET | Refills: 11 | Status: SHIPPED | OUTPATIENT
Start: 2021-11-08 | End: 2022-10-31

## 2021-12-06 RX ORDER — LISINOPRIL 10 MG/1
TABLET ORAL
Qty: 30 TABLET | Refills: 11 | Status: SHIPPED | OUTPATIENT
Start: 2021-12-06 | End: 2022-04-01

## 2022-03-01 RX ORDER — SERTRALINE HYDROCHLORIDE 100 MG/1
TABLET, FILM COATED ORAL
Qty: 180 TABLET | Refills: 3 | Status: SHIPPED | OUTPATIENT
Start: 2022-03-01 | End: 2023-02-27

## 2022-03-16 ENCOUNTER — PATIENT MESSAGE (OUTPATIENT)
Dept: FAMILY MEDICINE CLINIC | Facility: CLINIC | Age: 57
End: 2022-03-16

## 2022-03-20 RX ORDER — INSULIN DETEMIR 100 [IU]/ML
40 INJECTION, SOLUTION SUBCUTANEOUS 2 TIMES DAILY
Qty: 30 ML | Refills: 12 | Status: SHIPPED | OUTPATIENT
Start: 2022-03-20 | End: 2022-06-08 | Stop reason: SDUPTHER

## 2022-03-20 NOTE — TELEPHONE ENCOUNTER
From: Lupe Bridges  To: Britney Villeda MD  Sent: 3/16/2022 12:40 PM EDT  Subject: insulin    Dr. Villeda,     Les got new insurance with his new job, it has finally kicked in. It does not cover lantus but will cover levemir. I am almost out of my lantus and need a new prescription for the levemir. I am making an appointment to come see you but I will be out of the insulin before I come in.     Thanks  Soha Bridges

## 2022-04-01 ENCOUNTER — LAB (OUTPATIENT)
Dept: FAMILY MEDICINE CLINIC | Facility: CLINIC | Age: 57
End: 2022-04-01

## 2022-04-01 ENCOUNTER — OFFICE VISIT (OUTPATIENT)
Dept: FAMILY MEDICINE CLINIC | Facility: CLINIC | Age: 57
End: 2022-04-01

## 2022-04-01 VITALS
BODY MASS INDEX: 40.23 KG/M2 | TEMPERATURE: 96.2 F | DIASTOLIC BLOOD PRESSURE: 80 MMHG | RESPIRATION RATE: 18 BRPM | SYSTOLIC BLOOD PRESSURE: 150 MMHG | OXYGEN SATURATION: 100 % | WEIGHT: 281 LBS | HEIGHT: 70 IN | HEART RATE: 54 BPM

## 2022-04-01 DIAGNOSIS — Z12.11 SCREEN FOR COLON CANCER: ICD-10-CM

## 2022-04-01 DIAGNOSIS — G89.29 CHRONIC LEFT SHOULDER PAIN: ICD-10-CM

## 2022-04-01 DIAGNOSIS — M25.512 CHRONIC LEFT SHOULDER PAIN: ICD-10-CM

## 2022-04-01 DIAGNOSIS — E11.9 TYPE 2 DIABETES MELLITUS WITHOUT COMPLICATION, WITHOUT LONG-TERM CURRENT USE OF INSULIN: ICD-10-CM

## 2022-04-01 DIAGNOSIS — Z12.31 BREAST CANCER SCREENING BY MAMMOGRAM: ICD-10-CM

## 2022-04-01 DIAGNOSIS — I10 PRIMARY HYPERTENSION: Primary | ICD-10-CM

## 2022-04-01 LAB
ALBUMIN SERPL-MCNC: 4.4 G/DL (ref 3.5–5.2)
ALBUMIN/GLOB SERPL: 1.3 G/DL
ALP SERPL-CCNC: 100 U/L (ref 39–117)
ALT SERPL W P-5'-P-CCNC: 13 U/L (ref 1–33)
ANION GAP SERPL CALCULATED.3IONS-SCNC: 11.9 MMOL/L (ref 5–15)
AST SERPL-CCNC: 10 U/L (ref 1–32)
BILIRUB SERPL-MCNC: 0.3 MG/DL (ref 0–1.2)
BUN SERPL-MCNC: 8 MG/DL (ref 6–20)
BUN/CREAT SERPL: 17.8 (ref 7–25)
CALCIUM SPEC-SCNC: 9.8 MG/DL (ref 8.6–10.5)
CHLORIDE SERPL-SCNC: 100 MMOL/L (ref 98–107)
CHOLEST SERPL-MCNC: 159 MG/DL (ref 0–200)
CO2 SERPL-SCNC: 28.1 MMOL/L (ref 22–29)
CREAT SERPL-MCNC: 0.45 MG/DL (ref 0.57–1)
EGFRCR SERPLBLD CKD-EPI 2021: 111.7 ML/MIN/1.73
GLOBULIN UR ELPH-MCNC: 3.3 GM/DL
GLUCOSE SERPL-MCNC: 152 MG/DL (ref 65–99)
HDLC SERPL-MCNC: 36 MG/DL (ref 40–60)
LDLC SERPL CALC-MCNC: 83 MG/DL (ref 0–100)
LDLC/HDLC SERPL: 2.09 {RATIO}
POTASSIUM SERPL-SCNC: 4.9 MMOL/L (ref 3.5–5.2)
PROT SERPL-MCNC: 7.7 G/DL (ref 6–8.5)
SODIUM SERPL-SCNC: 140 MMOL/L (ref 136–145)
TRIGL SERPL-MCNC: 238 MG/DL (ref 0–150)
VLDLC SERPL-MCNC: 40 MG/DL (ref 5–40)

## 2022-04-01 PROCEDURE — 80053 COMPREHEN METABOLIC PANEL: CPT | Performed by: INTERNAL MEDICINE

## 2022-04-01 PROCEDURE — 36415 COLL VENOUS BLD VENIPUNCTURE: CPT | Performed by: INTERNAL MEDICINE

## 2022-04-01 PROCEDURE — 80061 LIPID PANEL: CPT | Performed by: INTERNAL MEDICINE

## 2022-04-01 PROCEDURE — 99214 OFFICE O/P EST MOD 30 MIN: CPT | Performed by: INTERNAL MEDICINE

## 2022-04-01 PROCEDURE — 20610 DRAIN/INJ JOINT/BURSA W/O US: CPT | Performed by: INTERNAL MEDICINE

## 2022-04-01 PROCEDURE — 83036 HEMOGLOBIN GLYCOSYLATED A1C: CPT | Performed by: INTERNAL MEDICINE

## 2022-04-01 RX ORDER — AZITHROMYCIN 250 MG/1
TABLET, FILM COATED ORAL
Qty: 6 TABLET | Refills: 0 | Status: SHIPPED | OUTPATIENT
Start: 2022-04-01 | End: 2022-07-13

## 2022-04-01 RX ORDER — TRIAMCINOLONE ACETONIDE 40 MG/ML
40 INJECTION, SUSPENSION INTRA-ARTICULAR; INTRAMUSCULAR ONCE
Status: DISCONTINUED | OUTPATIENT
Start: 2022-04-01 | End: 2022-10-05

## 2022-04-01 RX ORDER — INSULIN DETEMIR 100 [IU]/ML
INJECTION, SOLUTION SUBCUTANEOUS
COMMUNITY
Start: 2022-03-22 | End: 2022-06-08 | Stop reason: SDUPTHER

## 2022-04-01 RX ORDER — LISINOPRIL 20 MG/1
20 TABLET ORAL DAILY
Qty: 90 TABLET | Refills: 1 | Status: SHIPPED | OUTPATIENT
Start: 2022-04-01 | End: 2022-10-03

## 2022-04-01 NOTE — PROGRESS NOTES
Rooming Tab(CC,VS,Pt Hx,Fall Screen)  Chief Complaint   Patient presents with   • Diabetes       Subjective      Diabetes  The patient reports her blood glucose levels have been higher than 200 mg/dL. She states she was not be able to do her full dose of insulin due to not having insurance; however, now she has insurance, is using Levemir and had a morning glucose level of 120 mg/dL. Lupe states she administers 40 units of Levemir twice per day. The patient states her insurance company pays for her lancets and testing strips, which are sent to her monthly.     Stress  She states her stress level has improved since her  got a job and they now have medical insurance. Lupe is still taking Zoloft as directed and denies needing to add another medication or increasing her dose.     Blood pressure  The patients blood pressure is elevated and she notes her headaches are worse.      Allergies  The patient states she does get out much during spring time due to issues with her sinuses. She complains of rhinorrhea with yellow mucus. She complains of bilateral ear draining. She takes Allegra and states she does not like nasal sprays.     Weight gain  She states she is going to try to get back to the [facility] to use their heated pool for exercise.     Health maintenance  The patient states she has never had a colonoscopy and as of right now does not wish to proceed with one. She states she does not want a pneumonia shot. She states it has been a while since she has had a tetanus shot. The patient states she does not know if she has ever had chickenpox. She denies ever having shingles    Left shoulder pain  The patient is complaining about left shoulder pain and can no longer raise her arm posteriorly. She is able to  items with her left hand without dropping them.     Foot callus   The patient states her foot callus is back and she plans to follow up with her podiatrist.     I have reviewed and updated her  medications, medical history and problem list during today's office visit.     Patient Care Team:  Britney Villeda MD as PCP - General (Internal Medicine)  Britney Villeda MD as PCP - Internal Medicine (Internal Medicine & Pediatrics)    Problem List Tab  Medications Tab  Synopsis Tab  Chart Review Tab  Care Everywhere Tab  Immunizations Tab  Patient History Tab    Social History     Tobacco Use   • Smoking status: Former Smoker     Packs/day: 3.00     Years: 20.00     Pack years: 60.00     Types: Cigarettes   • Smokeless tobacco: Never Used   Substance Use Topics   • Alcohol use: Not Currently       Review of Systems  Answers for HPI/ROS submitted by the patient on 3/31/2022  What is the primary reason for your visit?: Diabetes  Diabetes type: type 2  MedicAlert ID: No  Disease duration: 20 years  foot paresthesias: Yes  foot ulcerations: No  visual change: No  headaches: Yes  hunger: No  mood changes: Yes  sleepiness: Yes  sweats: Yes  blackouts: No  hospitalization: No  nocturnal hypoglycemia: No  required assistance: No  required glucagon: No  CVA: No  heart disease: No  impotence: No  nephropathy: No  peripheral neuropathy: No  PVD: No  retinopathy: No  CAD risks: hypertension, obesity, post-menopausal, sedentary lifestyle, stress  Current treatments: insulin injections, oral agent (monotherapy)  Treatment compliance: most of the time  Dose schedule: pre-breakfast, at bedtime  Given by: patient  Injection sites: abdominal wall  Home blood tests: 1-2 x per day  Monitoring compliance: good  Blood glucose trend: fluctuating minimally  breakfast time: 7-8 am  breakfast glucose level: 180-200  dinner time: after 8 pm  dinner glucose level: >200  High score: >200  Overall: >200  Weight trend: increasing steadily  Current diet: generally healthy  Meal planning: none  Exercise: intermittently  Dietitian visit: No  Eye exam current: No  Sees podiatrist: Yes      Objective     Rooming Tab(CC,VS,Pt Hx,Fall  "Screen)  /80 (BP Location: Right arm, Patient Position: Sitting, Cuff Size: Adult)   Pulse 54   Temp 96.2 °F (35.7 °C) (Temporal)   Resp 18   Ht 177.8 cm (70\")   Wt 127 kg (281 lb)   SpO2 100%   BMI 40.32 kg/m²     Body mass index is 40.32 kg/m².    Physical Exam  Vitals and nursing note reviewed.   Constitutional:       Appearance: Normal appearance. She is well-developed. She is obese.   HENT:      Head: Normocephalic and atraumatic.      Right Ear: Tympanic membrane normal.      Left Ear: Tympanic membrane normal.      Nose: No rhinorrhea.      Mouth/Throat:      Pharynx: No posterior oropharyngeal erythema.   Eyes:      Pupils: Pupils are equal, round, and reactive to light.   Cardiovascular:      Rate and Rhythm: Normal rate and regular rhythm.      Pulses: Normal pulses.      Heart sounds: Normal heart sounds. No murmur heard.  Pulmonary:      Effort: Pulmonary effort is normal.      Breath sounds: Normal breath sounds.   Abdominal:      General: Bowel sounds are normal. There is no distension.      Palpations: Abdomen is soft.   Musculoskeletal:         General: Tenderness present.      Cervical back: Normal range of motion and neck supple.   Skin:     Capillary Refill: Capillary refill takes less than 2 seconds.   Neurological:      Mental Status: She is alert and oriented to person, place, and time.   Psychiatric:         Mood and Affect: Mood normal.         Behavior: Behavior normal.          Statin Choice Calculator  Data Reviewed:      Procedure Note:   Procedure performed:Left shoulder injection    The risks (including but not limited pain, bleeding and infection) and benefits of the procedure  were discussed with patient. Questions were sought and answered and informed consent was given for the procedure.     The procedure site was prepped and draped in standard bedside fashion for the procedure as indicated. The skin and deeper tissue was anesthetized with 1 cc's of Bupivacaine 0.25% " without epinephrine. A 22 guage needle was utilized for the procedure and it was performed without complications.  The joint was injected with 1 cc's of kenalog    The patient tolerated the procedure well and my repeat post-procedure exam was unremarkable for any problems related to the procedure    Instructions were given to contact us for any problems related to the procedure.      The data below has been reviewed by Britney Villeda MD on 04/01/2022.      Lab Results   Component Value Date    BUN 8 04/01/2022    CREATININE 0.45 (L) 04/01/2022     04/01/2022    K 4.9 04/01/2022     04/01/2022    CALCIUM 9.8 04/01/2022    ALBUMIN 4.40 04/01/2022    BILITOT 0.3 04/01/2022    ALKPHOS 100 04/01/2022    AST 10 04/01/2022    ALT 13 04/01/2022    TRIG 238 (H) 04/01/2022    HDL 36 (L) 04/01/2022    VLDL 40 04/01/2022    LDL 83 04/01/2022    LDLHDL 2.09 04/01/2022      Assessment/Plan   Order Review Tab  Health Maintenance Tab  Patient Plan/Order Tab  Diagnoses and all orders for this visit:    1. Primary hypertension (Primary)    2. Type 2 diabetes mellitus without complication, without long-term current use of insulin (HCC)  Comments:  needs better control will work on exrecise  Orders:  -     Comprehensive Metabolic Panel  -     Hemoglobin A1c  -     Lipid Panel    3. Breast cancer screening by mammogram  -     Mammo Screening Digital Tomosynthesis Bilateral With CAD; Future    4. Chronic left shoulder pain  Comments:  tolerated injection well  Orders:  -     triamcinolone acetonide (KENALOG-40) injection 40 mg    5. Screen for colon cancer  -     Occult Blood X 1, Stool - Stool, Per Rectum; Future    Other orders  -     lisinopril (PRINIVIL,ZESTRIL) 20 MG tablet; Take 1 tablet by mouth Daily for 180 days.  Dispense: 90 tablet; Refill: 1  -     Tdap Vaccine Greater Than or Equal To 6yo IM  -     azithromycin (Zithromax Z-Ozzy) 250 MG tablet; Take 2 tablets the first day, then 1 tablet daily for 4 days.   Dispense: 6 tablet; Refill: 0    1. Type 2 diabetes mellitus without complication, without long-term current use of insulin (HCC) (Primary)  • She will continue current medication regimen. Lab work has been ordered and the patient will obtain. She will keep me updated on her at home blood glucose levels.     2. Primary hypertension  • We are increasing her lisinopril dose to 20 mg per day. She currently has lisinopril 10 mg at home, she will take 2 tablets per day until she runs out of her script, then she will begin taking 1 lisinopril 20 mg tablet per day.     3. Mammogram screening  • Mammogram order has been sent and the patient will obtain.     4. Chronic left shoulder pain.   • The patient received a Kenalog injection today. If her pain is not better in 2 weeks, we will obtain a MRI.     5. Sinus infection  • I believe the patient is at the beginning stage of a sinus infection. Azithromycin has been sent to her pharmacy and she will take as directed.      6. Health maintenance  • The patient does not wish to proceed with a colonoscopy at this time. She was given a hemoccult test, which she may drop off or mail to the office.   Wrapup Tab  Return if symptoms worsen or fail to improve.       They were informed of the diagnosis and treatment plan and directed to f/u for any further problems or concerns.              Transcribed from ambient dictation for Britney Villeda MD by GÓMEZ JACKSON.  04/01/22   12:43 EDT    Patient verbalized consent to the visit recording.  I have personally performed the services described in this document as transcribed by the above individual, and it is both accurate and complete.  Britney Villeda MD  4/17/2022  10:12 EDT

## 2022-04-01 NOTE — PROGRESS NOTES
Rooming Tab(CC,VS,Pt Hx,Fall Screen)  Chief Complaint   Patient presents with   • Diabetes       Subjective     I have reviewed and updated her medications, medical history and problem list during today's office visit.     Patient Care Team:  Britney Villeda MD as PCP - General (Internal Medicine)  Britney Villeda MD as PCP - Internal Medicine (Internal Medicine & Pediatrics)    Problem List Tab  Medications Tab  Synopsis Tab  Chart Review Tab  Care Everywhere Tab  Immunizations Tab  Patient History Tab    Social History     Tobacco Use   • Smoking status: Former Smoker     Packs/day: 3.00     Years: 20.00     Pack years: 60.00     Types: Cigarettes   • Smokeless tobacco: Never Used   Substance Use Topics   • Alcohol use: Not Currently       Review of Systems  Answers for HPI/ROS submitted by the patient on 3/31/2022  What is the primary reason for your visit?: Diabetes  Diabetes type: type 2  MedicAlert ID: No  Disease duration: 20 years  foot paresthesias: Yes  foot ulcerations: No  visual change: No  headaches: Yes  hunger: No  mood changes: Yes  sleepiness: Yes  sweats: Yes  blackouts: No  hospitalization: No  nocturnal hypoglycemia: No  required assistance: No  required glucagon: No  CVA: No  heart disease: No  impotence: No  nephropathy: No  peripheral neuropathy: No  PVD: No  retinopathy: No  CAD risks: hypertension, obesity, post-menopausal, sedentary lifestyle, stress  Current treatments: insulin injections, oral agent (monotherapy)  Treatment compliance: most of the time  Dose schedule: pre-breakfast, at bedtime  Given by: patient  Injection sites: abdominal wall  Home blood tests: 1-2 x per day  Monitoring compliance: good  Blood glucose trend: fluctuating minimally  breakfast time: 7-8 am  breakfast glucose level: 180-200  dinner time: after 8 pm  dinner glucose level: >200  High score: >200  Overall: >200  Weight trend: increasing steadily  Current diet: generally healthy  Meal planning:  "none  Exercise: intermittently  Dietitian visit: No  Eye exam current: No  Sees podiatrist: Yes      Objective     Rooming Tab(CC,VS,Pt Hx,Fall Screen)  /80 (BP Location: Right arm, Patient Position: Sitting, Cuff Size: Adult)   Pulse 54   Temp 96.2 °F (35.7 °C) (Temporal)   Resp 18   Ht 177.8 cm (70\")   Wt 127 kg (281 lb)   SpO2 100%   BMI 40.32 kg/m²     Body mass index is 40.32 kg/m².    Physical Exam     Statin Choice Calculator  Data Reviewed:      Procedure Note:   Procedure performed:Left shoulder injection    The risks (including but not limited pain, bleeding and infection) and benefits of the procedure  were discussed with patient. Questions were sought and answered and informed consent was given for the procedure.     The procedure site was prepped and draped in standard bedside fashion for the procedure as indicated. The skin and deeper tissue was anesthetized with 1 cc's of Bupivacaine 0.25% without epinephrine. A 22 guage needle was utilized for the procedure and it was performed without complications.  The joint was injected with 1 cc's of kenalog    The patient tolerated the procedure well and my repeat post-procedure exam was unremarkable for any problems related to the procedure    Instructions were given to contact us for any problems related to the procedure.      The data below has been reviewed by Britney Villeda MD on 04/01/2022.      Lab Results   Component Value Date    BUN 8 04/01/2022    CREATININE 0.45 (L) 04/01/2022     04/01/2022    K 4.9 04/01/2022     04/01/2022    CALCIUM 9.8 04/01/2022    ALBUMIN 4.40 04/01/2022    BILITOT 0.3 04/01/2022    ALKPHOS 100 04/01/2022    AST 10 04/01/2022    ALT 13 04/01/2022    TRIG 238 (H) 04/01/2022    HDL 36 (L) 04/01/2022    VLDL 40 04/01/2022    LDL 83 04/01/2022    LDLHDL 2.09 04/01/2022      Assessment/Plan   Order Review Tab  Health Maintenance Tab  Patient Plan/Order Tab  Diagnoses and all orders for this " visit:    1. Primary hypertension (Primary)    2. Type 2 diabetes mellitus without complication, without long-term current use of insulin (HCC)  -     Comprehensive Metabolic Panel  -     Hemoglobin A1c  -     Lipid Panel    3. Breast cancer screening by mammogram  -     Mammo Screening Digital Tomosynthesis Bilateral With CAD; Future    4. Chronic left shoulder pain  -     triamcinolone acetonide (KENALOG-40) injection 40 mg    5. Screen for colon cancer  -     Occult Blood X 1, Stool - Stool, Per Rectum; Future    Other orders  -     lisinopril (PRINIVIL,ZESTRIL) 20 MG tablet; Take 1 tablet by mouth Daily for 180 days.  Dispense: 90 tablet; Refill: 1  -     Tdap Vaccine Greater Than or Equal To 6yo IM  -     azithromycin (Zithromax Z-Ozzy) 250 MG tablet; Take 2 tablets the first day, then 1 tablet daily for 4 days.  Dispense: 6 tablet; Refill: 0        Wrapup Tab  Return if symptoms worsen or fail to improve.       They were informed of the diagnosis and treatment plan and directed to f/u for any further problems or concerns.

## 2022-04-04 LAB — HBA1C MFR BLD: 8.5 % (ref 3.5–5.6)

## 2022-04-13 ENCOUNTER — APPOINTMENT (OUTPATIENT)
Dept: MAMMOGRAPHY | Facility: HOSPITAL | Age: 57
End: 2022-04-13

## 2022-04-17 PROBLEM — M25.512 CHRONIC LEFT SHOULDER PAIN: Status: ACTIVE | Noted: 2022-04-17

## 2022-04-17 PROBLEM — Z12.31 BREAST CANCER SCREENING BY MAMMOGRAM: Status: ACTIVE | Noted: 2022-04-17

## 2022-04-17 PROBLEM — G89.29 CHRONIC LEFT SHOULDER PAIN: Status: ACTIVE | Noted: 2022-04-17

## 2022-04-17 PROBLEM — Z12.11 SCREEN FOR COLON CANCER: Status: ACTIVE | Noted: 2022-04-17

## 2022-04-20 ENCOUNTER — HOSPITAL ENCOUNTER (OUTPATIENT)
Dept: MAMMOGRAPHY | Facility: HOSPITAL | Age: 57
Discharge: HOME | End: 2022-04-20
Admitting: INTERNAL MEDICINE

## 2022-04-20 DIAGNOSIS — Z12.31 BREAST CANCER SCREENING BY MAMMOGRAM: ICD-10-CM

## 2022-04-20 PROCEDURE — 77067 SCR MAMMO BI INCL CAD: CPT

## 2022-04-20 PROCEDURE — 77063 BREAST TOMOSYNTHESIS BI: CPT

## 2022-06-06 ENCOUNTER — PATIENT MESSAGE (OUTPATIENT)
Dept: FAMILY MEDICINE CLINIC | Facility: CLINIC | Age: 57
End: 2022-06-06

## 2022-06-08 ENCOUNTER — TELEPHONE (OUTPATIENT)
Dept: FAMILY MEDICINE CLINIC | Facility: CLINIC | Age: 57
End: 2022-06-08

## 2022-06-08 RX ORDER — INSULIN DETEMIR 100 [IU]/ML
40 INJECTION, SOLUTION SUBCUTANEOUS 2 TIMES DAILY
Qty: 24 PEN | Refills: 1 | Status: SHIPPED | OUTPATIENT
Start: 2022-06-08 | End: 2022-07-13 | Stop reason: SDUPTHER

## 2022-06-08 RX ORDER — INSULIN DETEMIR 100 [IU]/ML
40 INJECTION, SOLUTION SUBCUTANEOUS 2 TIMES DAILY
Qty: 90 ML | Refills: 1 | Status: SHIPPED | OUTPATIENT
Start: 2022-06-08 | End: 2022-06-08 | Stop reason: SDUPTHER

## 2022-06-08 NOTE — TELEPHONE ENCOUNTER
From: Lupe Bridges  To: Britney Villeda MD  Sent: 6/6/2022 9:07 AM EDT  Subject: insulin    Dr. Villeda,   I hate to bother you again about my insulin prescription but the insurance is now telling me if I don't want to pay $912 a month for my insulin I need to go to 90 day supply. So, I need a new script for my Levemir flex touch for 90 days at a time. I also have to have it filled at [address] [place] [address].  Thank you so much for your help.  Lupe Bridges

## 2022-06-08 NOTE — TELEPHONE ENCOUNTER
----- Message from Kamryn Gamez MA sent at 6/8/2022  3:12 PM EDT -----  Regarding: FW: 90 day supply insulin    ----- Message -----  From: Lupe Bridges  Sent: 6/8/2022   8:58 AM EDT  To: Britney Villeda MD  Subject: 90 day supply insulin                            [facility] told me that I can get it filled at the [facility] for $75 for the 90 day supply. They just won't give me the 30 day supply at $25 dollars anymore.

## 2022-07-13 ENCOUNTER — LAB (OUTPATIENT)
Dept: FAMILY MEDICINE CLINIC | Facility: CLINIC | Age: 57
End: 2022-07-13

## 2022-07-13 ENCOUNTER — OFFICE VISIT (OUTPATIENT)
Dept: FAMILY MEDICINE CLINIC | Facility: CLINIC | Age: 57
End: 2022-07-13

## 2022-07-13 VITALS
HEIGHT: 70 IN | DIASTOLIC BLOOD PRESSURE: 80 MMHG | OXYGEN SATURATION: 93 % | WEIGHT: 275 LBS | BODY MASS INDEX: 39.37 KG/M2 | SYSTOLIC BLOOD PRESSURE: 122 MMHG | TEMPERATURE: 97.3 F | HEART RATE: 66 BPM | RESPIRATION RATE: 16 BRPM

## 2022-07-13 DIAGNOSIS — N84.1 POLYP OF CERVIX: ICD-10-CM

## 2022-07-13 DIAGNOSIS — Z01.419 GYNECOLOGIC EXAM NORMAL: ICD-10-CM

## 2022-07-13 DIAGNOSIS — Z00.00 ENCOUNTER FOR GENERAL ADULT MEDICAL EXAMINATION WITHOUT ABNORMAL FINDINGS: ICD-10-CM

## 2022-07-13 DIAGNOSIS — Z12.11 SCREENING FOR COLON CANCER: ICD-10-CM

## 2022-07-13 DIAGNOSIS — E11.9 TYPE 2 DIABETES MELLITUS WITHOUT COMPLICATION, WITHOUT LONG-TERM CURRENT USE OF INSULIN: ICD-10-CM

## 2022-07-13 DIAGNOSIS — F33.1 MAJOR DEPRESSIVE DISORDER, RECURRENT EPISODE, MODERATE DEGREE: Primary | ICD-10-CM

## 2022-07-13 LAB
BASOPHILS # BLD AUTO: 0.04 10*3/MM3 (ref 0–0.2)
BASOPHILS NFR BLD AUTO: 0.4 % (ref 0–1.5)
DEPRECATED RDW RBC AUTO: 42 FL (ref 37–54)
DEVELOPER EXPIRATION DATE: NORMAL
DEVELOPER LOT NUMBER: NORMAL
EOSINOPHIL # BLD AUTO: 0.12 10*3/MM3 (ref 0–0.4)
EOSINOPHIL NFR BLD AUTO: 1.2 % (ref 0.3–6.2)
ERYTHROCYTE [DISTWIDTH] IN BLOOD BY AUTOMATED COUNT: 13.1 % (ref 12.3–15.4)
EXPIRATION DATE: NORMAL
FECAL OCCULT BLOOD SCREEN, POC: NEGATIVE
HBA1C MFR BLD: 8.3 % (ref 3.5–5.6)
HCT VFR BLD AUTO: 37.9 % (ref 34–46.6)
HGB BLD-MCNC: 12.1 G/DL (ref 12–15.9)
IMM GRANULOCYTES # BLD AUTO: 0.05 10*3/MM3 (ref 0–0.05)
IMM GRANULOCYTES NFR BLD AUTO: 0.5 % (ref 0–0.5)
LYMPHOCYTES # BLD AUTO: 2.61 10*3/MM3 (ref 0.7–3.1)
LYMPHOCYTES NFR BLD AUTO: 26.1 % (ref 19.6–45.3)
Lab: NORMAL
MCH RBC QN AUTO: 28 PG (ref 26.6–33)
MCHC RBC AUTO-ENTMCNC: 31.9 G/DL (ref 31.5–35.7)
MCV RBC AUTO: 87.7 FL (ref 79–97)
MONOCYTES # BLD AUTO: 0.58 10*3/MM3 (ref 0.1–0.9)
MONOCYTES NFR BLD AUTO: 5.8 % (ref 5–12)
NEGATIVE CONTROL: NEGATIVE
NEUTROPHILS NFR BLD AUTO: 6.6 10*3/MM3 (ref 1.7–7)
NEUTROPHILS NFR BLD AUTO: 66 % (ref 42.7–76)
NRBC BLD AUTO-RTO: 0 /100 WBC (ref 0–0.2)
PLATELET # BLD AUTO: 434 10*3/MM3 (ref 140–450)
PMV BLD AUTO: 9.1 FL (ref 6–12)
POSITIVE CONTROL: POSITIVE
RBC # BLD AUTO: 4.32 10*6/MM3 (ref 3.77–5.28)
WBC NRBC COR # BLD: 10 10*3/MM3 (ref 3.4–10.8)

## 2022-07-13 PROCEDURE — 85025 COMPLETE CBC W/AUTO DIFF WBC: CPT | Performed by: INTERNAL MEDICINE

## 2022-07-13 PROCEDURE — 36415 COLL VENOUS BLD VENIPUNCTURE: CPT | Performed by: INTERNAL MEDICINE

## 2022-07-13 PROCEDURE — 90715 TDAP VACCINE 7 YRS/> IM: CPT | Performed by: INTERNAL MEDICINE

## 2022-07-13 PROCEDURE — 82270 OCCULT BLOOD FECES: CPT | Performed by: INTERNAL MEDICINE

## 2022-07-13 PROCEDURE — 99396 PREV VISIT EST AGE 40-64: CPT | Performed by: INTERNAL MEDICINE

## 2022-07-13 PROCEDURE — 83036 HEMOGLOBIN GLYCOSYLATED A1C: CPT | Performed by: INTERNAL MEDICINE

## 2022-07-13 PROCEDURE — 90471 IMMUNIZATION ADMIN: CPT | Performed by: INTERNAL MEDICINE

## 2022-07-13 RX ORDER — MELATONIN
1000 DAILY
COMMUNITY

## 2022-07-13 RX ORDER — INSULIN DETEMIR 100 [IU]/ML
47 INJECTION, SOLUTION SUBCUTANEOUS 2 TIMES DAILY
Qty: 24 PEN | Refills: 1 | Status: SHIPPED | OUTPATIENT
Start: 2022-07-13 | End: 2022-09-06 | Stop reason: SDUPTHER

## 2022-07-13 RX ORDER — FERROUS SULFATE 325(65) MG
325 TABLET ORAL
COMMUNITY

## 2022-07-13 NOTE — PROGRESS NOTES
Rooming Tab(CC,VS,Pt Hx,Fall Screen)  Chief Complaint   Patient presents with   • Annual Exam   • Gynecologic Exam       Subjective   Diabetes  The patient reports she has been tired a lot and stressed out. She states her blood sugars have been running high since 06/16/2022. She notes she is taking her full dosage of insulin. She admits she is not eating any differently. She adds her movement is about the same. She states she has lost some weight. She reports she has not been trying to lose weight. She notes her sugars are high. She admits she keeps her sugars in the fridge. She adds she has not had intercourse and she does not have any problems. She states she is trying to do her breast exams at home. She reports she did her mammogram and it was normal. She notes she does not take the pneumonia shots. She admits she is overdue for an eye exam for diabetes. She adds she can not forget to do her tetanus shot today. She reports that she did not do her Cologuard. She states she can not do a shot for her shoulder again. She reports her insurance did not cover it. They said it's not medically necessary. She notes it did help for a while. She admits she has tonsil stones that have been irritating her. She adds she has never tried gargling salt water. She states she has not been taking Allegra lately. She reports Claritin does not work as well. She notes she has tried Xyzal. She admits she can take it with her medications. She admits that she uses baby talc under her breasts due to a rash.     Skin tag  She states she has a skin tag. She reports it does not bother her. She notes it has been there ever since she can remember. She admits it has been the same size as long as she can remember.     GYN  She adds she has not seen a GYN.    I have reviewed and updated her medications, medical history and problem list during today's office visit.     Patient Care Team:  Britney Villeda MD as PCP - General (Internal  "Medicine)  Britney Villeda MD as PCP - Internal Medicine (Internal Medicine & Pediatrics)    Problem List Tab  Medications Tab  Synopsis Tab  Chart Review Tab  Care Everywhere Tab  Immunizations Tab  Patient History Tab    Social History     Tobacco Use   • Smoking status: Former Smoker     Packs/day: 3.00     Years: 20.00     Pack years: 60.00     Types: Cigarettes, Cigarettes   • Smokeless tobacco: Never Used   Substance Use Topics   • Alcohol use: Not Currently       Review of Systems    Objective     Rooming Tab(CC,VS,Pt Hx,Fall Screen)  /80 (BP Location: Right arm, Patient Position: Sitting, Cuff Size: Large Adult)   Pulse 66   Temp 97.3 °F (36.3 °C) (Infrared)   Resp 16   Ht 177.8 cm (70\")   Wt 125 kg (275 lb)   SpO2 93%   BMI 39.46 kg/m²     Body mass index is 39.46 kg/m².    Physical Exam  Vitals and nursing note reviewed.   Constitutional:       Appearance: Normal appearance. She is well-developed. She is obese.   HENT:      Head: Normocephalic and atraumatic.      Right Ear: Tympanic membrane normal.      Left Ear: Tympanic membrane normal.      Nose: No rhinorrhea.      Mouth/Throat:      Pharynx: No posterior oropharyngeal erythema.   Eyes:      Pupils: Pupils are equal, round, and reactive to light.   Cardiovascular:      Rate and Rhythm: Normal rate and regular rhythm.      Pulses: Normal pulses.      Heart sounds: Normal heart sounds. No murmur heard.  Pulmonary:      Effort: Pulmonary effort is normal.      Breath sounds: Normal breath sounds.   Abdominal:      General: Bowel sounds are normal. There is no distension.      Palpations: Abdomen is soft.   Musculoskeletal:         General: Tenderness present.      Cervical back: Normal range of motion and neck supple.   Skin:     Capillary Refill: Capillary refill takes less than 2 seconds.   Neurological:      Mental Status: She is alert and oriented to person, place, and time.   Psychiatric:         Mood and Affect: Mood normal.    "      Behavior: Behavior normal.          Statin Choice Calculator  Data Reviewed:         The data below has been reviewed by Britney Villeda MD on 07/13/2022.      Lab Results   Component Value Date    WBC 10.00 07/13/2022    RBC 4.32 07/13/2022    HCT 37.9 07/13/2022    MCV 87.7 07/13/2022    MCH 28.0 07/13/2022      Assessment & Plan   Order Review Tab  Health Maintenance Tab  Patient Plan/Order Tab  Diagnoses and all orders for this visit:    1. Major depressive disorder, recurrent episode, moderate degree (HCC) (Primary)    2. Encounter for general adult medical examination without abnormal findings  -     Cancel: Comprehensive Metabolic Panel  -     Hemoglobin A1c  -     CBC Auto Differential    3. Gynecologic exam normal  -     IGP,Aptima HPV,Age Gdln; Future    4. Type 2 diabetes mellitus without complication, without long-term current use of insulin (HCC)  -     Hemoglobin A1c    5. Polyp of cervix  -     Ambulatory Referral to Obstetrics / Gynecology    6. Screening for colon cancer  -     Cancel: Occult Blood, Fecal By Immunoassay - Stool, Per Rectum; Future  -     POCT occult blood x 1 stool    Other orders  -     insulin detemir (Levemir FlexTouch) 100 UNIT/ML injection; Inject 47 Units under the skin into the appropriate area as directed 2 (Two) Times a Day for 180 days.  Dispense: 24 pen; Refill: 1  -     Tdap Vaccine Greater Than or Equal To 8yo IM      Type 2 diabetes mellitus without complication, without long-term current use of insulin (HCC) (Primary)  We will increase her Levemir from 40 mg to 47 units daily.  She will schedule an eye exam.  She will follow up in 3 months.  We will order labs to check her hemoglobin.     Colon cancer screening  She will receive a Cologuard kit today.    Sore throat  She will call me and tell me if she has a sinus infection.  I will send her a COVID-19 test.    GYN  I will send a referral to GYN due to a polyp on her cervix.  Wrapup Tab  Return if symptoms  worsen or fail to improve.    During this visit for their annual exam, we reviewed their personal history, social history and family history.  We went over their medications and all the recommended health maintenence items for their age group. They were given the opportunity to ask questions and discuss other concerns.    Transcribed from ambient dictation for Britney Villeda MD by MILAD[employer]S.  07/13/22   13:33 EDT    Patient verbalized consent to the visit recording.

## 2022-07-17 PROBLEM — N84.1 POLYP OF CERVIX: Status: ACTIVE | Noted: 2022-07-17

## 2022-07-17 PROBLEM — Z12.11 SCREENING FOR COLON CANCER: Status: ACTIVE | Noted: 2022-07-17

## 2022-08-28 ENCOUNTER — HOSPITAL ENCOUNTER (EMERGENCY)
Facility: HOSPITAL | Age: 57
Discharge: HOME | End: 2022-08-28
Attending: EMERGENCY MEDICINE | Admitting: EMERGENCY MEDICINE

## 2022-08-28 VITALS
RESPIRATION RATE: 17 BRPM | TEMPERATURE: 96.9 F | DIASTOLIC BLOOD PRESSURE: 76 MMHG | BODY MASS INDEX: 40.88 KG/M2 | OXYGEN SATURATION: 97 % | HEIGHT: 69 IN | HEART RATE: 86 BPM | WEIGHT: 276.02 LBS | SYSTOLIC BLOOD PRESSURE: 171 MMHG

## 2022-08-28 DIAGNOSIS — K13.79 UVULAR EDEMA: Primary | ICD-10-CM

## 2022-08-28 PROCEDURE — 96372 THER/PROPH/DIAG INJ SC/IM: CPT

## 2022-08-28 PROCEDURE — 25010000002 METHYLPREDNISOLONE PER 125 MG: Performed by: EMERGENCY MEDICINE

## 2022-08-28 PROCEDURE — 99282 EMERGENCY DEPT VISIT SF MDM: CPT

## 2022-08-28 RX ORDER — METHYLPREDNISOLONE SODIUM SUCCINATE 125 MG/2ML
80 INJECTION, POWDER, LYOPHILIZED, FOR SOLUTION INTRAMUSCULAR; INTRAVENOUS ONCE
Status: COMPLETED | OUTPATIENT
Start: 2022-08-28 | End: 2022-08-28

## 2022-08-28 RX ADMIN — METHYLPREDNISOLONE SODIUM SUCCINATE 80 MG: 125 INJECTION, POWDER, FOR SOLUTION INTRAMUSCULAR; INTRAVENOUS at 06:17

## 2022-09-05 ENCOUNTER — TELEPHONE (OUTPATIENT)
Dept: FAMILY MEDICINE CLINIC | Facility: CLINIC | Age: 57
End: 2022-09-05

## 2022-09-05 NOTE — TELEPHONE ENCOUNTER
----- Message from Lupe Bridges sent at 9/4/2022 11:50 PM EDT -----  Regarding: New Insurance and Levemir  Our new insurance has taken effect and it covers the levemir flex touch. I need a new prescription for it for a month at a time. I also need a script for accu-chek test strips and insulin pen needles 32 gauge 4mm. My pharmacy is the [facility] in [place].     Thank you for you help  Lupe Bridges

## 2022-09-06 RX ORDER — INSULIN DETEMIR 100 [IU]/ML
47 INJECTION, SOLUTION SUBCUTANEOUS 2 TIMES DAILY
Qty: 29 ML | Refills: 5 | Status: SHIPPED | OUTPATIENT
Start: 2022-09-06 | End: 2023-04-05

## 2022-09-06 RX ORDER — PEN NEEDLE, DIABETIC 32GX 5/32"
NEEDLE, DISPOSABLE MISCELLANEOUS
Qty: 200 EACH | Refills: 2 | Status: SHIPPED | OUTPATIENT
Start: 2022-09-06

## 2022-10-03 RX ORDER — LISINOPRIL 20 MG/1
TABLET ORAL
Qty: 30 TABLET | Refills: 2 | Status: SHIPPED | OUTPATIENT
Start: 2022-10-03 | End: 2023-01-02

## 2022-10-04 ENCOUNTER — OFFICE VISIT (OUTPATIENT)
Dept: FAMILY MEDICINE CLINIC | Facility: CLINIC | Age: 57
End: 2022-10-04

## 2022-10-04 VITALS
WEIGHT: 236.6 LBS | DIASTOLIC BLOOD PRESSURE: 72 MMHG | SYSTOLIC BLOOD PRESSURE: 160 MMHG | TEMPERATURE: 97.1 F | HEIGHT: 69 IN | HEART RATE: 104 BPM | BODY MASS INDEX: 35.04 KG/M2 | OXYGEN SATURATION: 96 % | RESPIRATION RATE: 16 BRPM

## 2022-10-04 DIAGNOSIS — J20.9 ACUTE BRONCHITIS, UNSPECIFIED ORGANISM: Primary | ICD-10-CM

## 2022-10-04 PROCEDURE — 99213 OFFICE O/P EST LOW 20 MIN: CPT | Performed by: PHYSICIAN ASSISTANT

## 2022-10-04 RX ORDER — BROMPHENIRAMINE MALEATE, PSEUDOEPHEDRINE HYDROCHLORIDE, AND DEXTROMETHORPHAN HYDROBROMIDE 2; 30; 10 MG/5ML; MG/5ML; MG/5ML
2.5 SYRUP ORAL 4 TIMES DAILY PRN
Qty: 473 ML | Refills: 0 | Status: SHIPPED | OUTPATIENT
Start: 2022-10-04 | End: 2022-10-09

## 2022-10-04 RX ORDER — AZITHROMYCIN 250 MG/1
TABLET, FILM COATED ORAL
Qty: 6 TABLET | Refills: 0 | Status: SHIPPED | OUTPATIENT
Start: 2022-10-04

## 2022-10-04 NOTE — PROGRESS NOTES
"Subjective   Lupe Bridges is a 57 y.o. female.     Chief Complaint   Patient presents with   • Fever   • Cough   • Fatigue   • Headache     Also throwing up. All started 2 days ago       /72   Pulse 104   Temp 97.1 °F (36.2 °C) (Infrared)   Resp 16   Ht 175.3 cm (69.02\")   Wt 107 kg (236 lb 9.6 oz)   SpO2 96%   BMI 34.92 kg/m²     BP Readings from Last 3 Encounters:   10/04/22 160/72   08/28/22 171/76   07/13/22 122/80       Wt Readings from Last 3 Encounters:   10/04/22 107 kg (236 lb 9.6 oz)   08/28/22 125 kg (276 lb 0.3 oz)   07/13/22 125 kg (275 lb)       HPI Presents to the clinic for cough, fever, chills, vomiting. Symptoms present for 2 days. OTC treatments include none. Members of household have the same symptoms.     The following portions of the patient's history were reviewed and updated as appropriate: allergies, current medications, past family history, past medical history, past social history, past surgical history and problem list.    Review of Systems    Objective   Physical Exam  Constitutional:       Appearance: Normal appearance.   HENT:      Right Ear: Tympanic membrane normal.      Left Ear: Tympanic membrane normal.      Nose: Congestion present.      Mouth/Throat:      Pharynx: Posterior oropharyngeal erythema present.   Eyes:      Extraocular Movements: Extraocular movements intact.      Pupils: Pupils are equal, round, and reactive to light.   Pulmonary:      Effort: Pulmonary effort is normal.      Breath sounds: No wheezing.   Neurological:      General: No focal deficit present.      Mental Status: She is alert and oriented to person, place, and time.   Psychiatric:         Mood and Affect: Mood normal.         Behavior: Behavior normal.           Diagnoses and all orders for this visit:    1. Acute bronchitis, unspecified organism (Primary)    Other orders  -     azithromycin (Zithromax Z-Ozzy) 250 MG tablet; Take 2 tablets by mouth on day 1, then 1 tablet daily on days " 2-5  Dispense: 6 tablet; Refill: 0  -     brompheniramine-pseudoephedrine-DM 30-2-10 MG/5ML syrup; Take 2.5 mL by mouth 4 (Four) Times a Day As Needed for Cough for up to 5 days.  Dispense: 473 mL; Refill: 0        No follow-ups on file.

## 2022-10-05 ENCOUNTER — TELEPHONE (OUTPATIENT)
Dept: FAMILY MEDICINE CLINIC | Facility: CLINIC | Age: 57
End: 2022-10-05

## 2022-10-05 DIAGNOSIS — J20.9 ACUTE BRONCHITIS, UNSPECIFIED ORGANISM: Primary | ICD-10-CM

## 2022-10-05 RX ORDER — PREDNISONE 20 MG/1
20 TABLET ORAL 2 TIMES DAILY
Qty: 10 TABLET | Refills: 0 | Status: SHIPPED | OUTPATIENT
Start: 2022-10-05 | End: 2022-10-31

## 2022-10-05 NOTE — TELEPHONE ENCOUNTER
Yes- [facility]- I have placed orders. Depending on how many days with symptoms- they would qualify for paxlovid

## 2022-10-05 NOTE — TELEPHONE ENCOUNTER
Spoke with Lupe, they both tested positive on home tests today. Ozzy did prescribe both of them antibiotics yesterday but they are willing to do paxlovid if you fell that is better option. I told her to hold on going to the hospital until I call her back. Lupe symptoms started Monday, Shani started Sunday.

## 2022-10-05 NOTE — TELEPHONE ENCOUNTER
JOSE MARTIN SAID SHE IS COUGHING A LOT AND WOULD LIKE TO TRY A STEROID IF YOU ARE OKAY WITH PRESCRIBING IT. I ADVISED HER TO WATCH HER DIET AND CHECK HER SUGAR. SHE USES [facility] ON [address] SHE SAID MATEUS SEEMS TO BE FEELING BETTER THAN YESTERDAY.

## 2022-10-05 NOTE — TELEPHONE ENCOUNTER
Lupe is on zpack- typical would add steroids with that for COVID- but will make her sugars high- if not much of cough/wheeze cn go without-    Shani on levaquin to treat for pneumonia- will hold f antiviral then as not compatible with the antibiotic- but watch her closely.

## 2022-10-05 NOTE — TELEPHONE ENCOUNTER
Caller: Lupe Bridges    Relationship: Self    Best call back number: [phone]    What was the call regarding: PATIENT STATED THAT SHE AND HER DAUGHTER WERE IN YESTERDAY BECAUSE THEY BOTH WERE RUNNING FEVERS, HAD SNOTTY NOSE, VOMITING, AND FATIGUE. PATIENT STATED THAT THEY WERE NOT TESTED FOR COVID, AND WAS TOLD IT WAS A VIRAL INFECTION. PATIENT STATED THAT HER  WENT TO URGENT CARE TODAY AND TESTED POSITIVE FOR COVID. PATIENT WOULD LIKE TO KNOW IF THEY NEED TO GO GET TESTED. PLEASE ADVISE.     Do you require a callback: YES

## 2022-10-31 RX ORDER — PREDNISONE 20 MG/1
TABLET ORAL
Qty: 10 TABLET | Refills: 0 | Status: SHIPPED | OUTPATIENT
Start: 2022-10-31

## 2022-10-31 RX ORDER — ATENOLOL 25 MG/1
TABLET ORAL
Qty: 30 TABLET | Refills: 11 | Status: SHIPPED | OUTPATIENT
Start: 2022-10-31

## 2023-01-02 RX ORDER — LISINOPRIL 20 MG/1
TABLET ORAL
Qty: 30 TABLET | Refills: 2 | Status: SHIPPED | OUTPATIENT
Start: 2023-01-02 | End: 2023-04-02

## 2023-02-27 RX ORDER — SERTRALINE HYDROCHLORIDE 100 MG/1
TABLET, FILM COATED ORAL
Qty: 180 TABLET | Refills: 3 | Status: SHIPPED | OUTPATIENT
Start: 2023-02-27

## 2023-04-02 RX ORDER — LISINOPRIL 20 MG/1
TABLET ORAL
Qty: 30 TABLET | Refills: 2 | Status: SHIPPED | OUTPATIENT
Start: 2023-04-02

## 2023-05-09 ENCOUNTER — TELEPHONE (OUTPATIENT)
Dept: FAMILY MEDICINE CLINIC | Facility: CLINIC | Age: 58
End: 2023-05-09
Payer: COMMERCIAL

## 2023-05-09 DIAGNOSIS — E11.9 TYPE 2 DIABETES MELLITUS WITHOUT COMPLICATION, WITHOUT LONG-TERM CURRENT USE OF INSULIN: Primary | ICD-10-CM

## 2023-05-09 NOTE — TELEPHONE ENCOUNTER
Ok for 1 more month of insulin and remind her that she needs to be seen and labs asap when they get insurance

## 2023-05-09 NOTE — TELEPHONE ENCOUNTER
----- Message from Lupe Bridges sent at 5/8/2023 11:28 PM EDT -----  Regarding: insulin  Contact: [phone]  Les still hasn't gotten a job with insurance. I am on the last pen of the tresiba. Is it possible to get another month of samples to see me thru?  Thanks  Soha Bridges

## 2023-05-11 NOTE — TELEPHONE ENCOUNTER
They have no isnurance- this was ok for another month of samples insulin- please- no need to come in this month- just give her samples.

## 2023-05-11 NOTE — TELEPHONE ENCOUNTER
Please let her know 1 month of insulin has been sent in and she needs to make an appt for labs and to see the MD asap/before the end of this supply.

## 2023-05-11 NOTE — TELEPHONE ENCOUNTER
HUB TO SHARE: SENT FOLLOWING [employer] MESSAGE. Dr [facility]illeda [facility] but said you need to be seen as soon as you can for labs and an appointment.

## 2023-08-08 RX ORDER — INSULIN DETEMIR 100 [IU]/ML
INJECTION, SOLUTION SUBCUTANEOUS
Qty: 30 ML | Refills: 3 | Status: SHIPPED | OUTPATIENT
Start: 2023-08-08

## 2023-08-08 NOTE — TELEPHONE ENCOUNTER
Caller: Lupe Bridges    Relationship: Self    Best call back number: [phone]    PATIENT ACTUALLY LOST HER INSURANCE.   SHE WAS TRYING SAMPLES OF TRESIBA.       THIS MEDICATION DID NOT WORK FOR HER AND YES, SHE DOES NEED HER INSULIN.  PATIENT HAS A COUPON TO RECEIVE LEVEMIR FOR 99$ THROUGH THE [employer].    NEXT OFFICE VISIT 8/30/23 WITH DR MAIN TO ESTABLISH CARE POST JO DOWD.    [id number] - [place], [place] - [address] - [phone]  - [phone]  [phone]    PLEASE GIVE PATIENT A CALLBACK.

## 2023-08-08 NOTE — TELEPHONE ENCOUNTER
HUB TO SHARE:     I called and left a message for Lupe to return our call to let us know if she need the pharmacy requested refill. Verbal on file.

## 2023-08-30 ENCOUNTER — OFFICE VISIT (OUTPATIENT)
Dept: FAMILY MEDICINE CLINIC | Facility: CLINIC | Age: 58
End: 2023-08-30
Payer: MEDICAID

## 2023-08-30 ENCOUNTER — LAB (OUTPATIENT)
Dept: FAMILY MEDICINE CLINIC | Facility: CLINIC | Age: 58
End: 2023-08-30
Payer: MEDICAID

## 2023-08-30 VITALS
OXYGEN SATURATION: 98 % | HEIGHT: 69 IN | SYSTOLIC BLOOD PRESSURE: 142 MMHG | WEIGHT: 270 LBS | HEART RATE: 71 BPM | RESPIRATION RATE: 18 BRPM | BODY MASS INDEX: 39.99 KG/M2 | DIASTOLIC BLOOD PRESSURE: 74 MMHG

## 2023-08-30 DIAGNOSIS — I10 PRIMARY HYPERTENSION: ICD-10-CM

## 2023-08-30 DIAGNOSIS — E11.9 TYPE 2 DIABETES MELLITUS WITHOUT COMPLICATION, WITH LONG-TERM CURRENT USE OF INSULIN: Primary | ICD-10-CM

## 2023-08-30 DIAGNOSIS — Z59.89 DOES NOT HAVE HEALTH INSURANCE: ICD-10-CM

## 2023-08-30 DIAGNOSIS — Z79.4 TYPE 2 DIABETES MELLITUS WITHOUT COMPLICATION, WITH LONG-TERM CURRENT USE OF INSULIN: Primary | ICD-10-CM

## 2023-08-30 DIAGNOSIS — R09.81 NASAL CONGESTION: ICD-10-CM

## 2023-08-30 LAB
DEPRECATED RDW RBC AUTO: 40.4 FL (ref 37–54)
ERYTHROCYTE [DISTWIDTH] IN BLOOD BY AUTOMATED COUNT: 13 % (ref 12.3–15.4)
HBA1C MFR BLD: 9.6 % (ref 4.8–5.6)
HCT VFR BLD AUTO: 37.3 % (ref 34–46.6)
HGB BLD-MCNC: 12.1 G/DL (ref 12–15.9)
MCH RBC QN AUTO: 28.1 PG (ref 26.6–33)
MCHC RBC AUTO-ENTMCNC: 32.4 G/DL (ref 31.5–35.7)
MCV RBC AUTO: 86.7 FL (ref 79–97)
PLATELET # BLD AUTO: 368 10*3/MM3 (ref 140–450)
PMV BLD AUTO: 9.1 FL (ref 6–12)
RBC # BLD AUTO: 4.3 10*6/MM3 (ref 3.77–5.28)
WBC NRBC COR # BLD: 8.83 10*3/MM3 (ref 3.4–10.8)

## 2023-08-30 PROCEDURE — 36415 COLL VENOUS BLD VENIPUNCTURE: CPT | Performed by: STUDENT IN AN ORGANIZED HEALTH CARE EDUCATION/TRAINING PROGRAM

## 2023-08-30 PROCEDURE — 83036 HEMOGLOBIN GLYCOSYLATED A1C: CPT | Performed by: STUDENT IN AN ORGANIZED HEALTH CARE EDUCATION/TRAINING PROGRAM

## 2023-08-30 PROCEDURE — 85027 COMPLETE CBC AUTOMATED: CPT | Performed by: STUDENT IN AN ORGANIZED HEALTH CARE EDUCATION/TRAINING PROGRAM

## 2023-08-30 RX ORDER — AZELASTINE HCL 205.5 UG/1
2 SPRAY NASAL DAILY
Qty: 30 ML | Refills: 1 | Status: SHIPPED | OUTPATIENT
Start: 2023-08-30

## 2023-08-30 RX ORDER — LISINOPRIL 20 MG/1
20 TABLET ORAL DAILY
Qty: 30 TABLET | Refills: 6 | Status: SHIPPED | OUTPATIENT
Start: 2023-08-30

## 2023-08-30 SDOH — ECONOMIC STABILITY - INCOME SECURITY: OTHER PROBLEMS RELATED TO HOUSING AND ECONOMIC CIRCUMSTANCES: Z59.89

## 2023-08-30 NOTE — PROGRESS NOTES
"Chief Complaint: T2DM  Chief Complaint   Patient presents with    Establish Care     Subjective        Lupe Bridges is a 58 y.o. female who presents to [facility].    History of Present Illness  Here to establish care with me.  We reviewed medical history and current medications.    T2DM on metformin 1000 mg bid and insulin levemir 47 units bid.    Her blood sugars at home are in the 200s - 300s.      HTN on atenolol 25 mg daily and lisinopril 20 mg daily.    Anxiety and depression on sertraline 100 mg daily.      Currently does not have health insurance.  Her  has been in and out of jobs.  He currently has a job but no health insurance benefit.  She said she called medicaid but was told she wasn't eligible because her  has a job.  Because of the lack of health insurance we have to be judicious about which labs we order today as she can't pay much out of pocket in addition to what she is already paying for above meds.  She uses [employer] card which helps with a lot of her medications.  Her levemir is $100 / month.      She does have a lot of nasal congestion, drainage, irritation.  Flonase makes her sick.  She takes an otc allergy pill daily.      Objective   /74   Pulse 71   Resp 18   Ht 175.3 cm (69.02\")   Wt 122 kg (270 lb)   SpO2 98%   BMI 39.85 kg/mý     Estimated body mass index is 39.85 kg/mý as calculated from the following:    Height as of this encounter: 175.3 cm (69.02\").    Weight as of this encounter: 122 kg (270 lb).     Physical Exam   GEN: In no acute distress, non toxic appearing  HEENT: Pupils equal and reactive to light, sclera clear. Mucous membranes moist. Oropharynx without erythema or exudate. No cervical or submandibular lymphadenopathy.  Nares erythematous and irritated.  Small amount of drainage    CV: Regular rate and rhythm, no murmurs, 2+ peripheral pulses, No extremity edema.   RESP: Lungs clear to auscultation " anteriorly and posteriorly in all lung fields bilaterally.  NEURO: AAO to person, place, and time. CN 2-12 intact grossly.   PSYCH: Affect normal, insight fair     PHQ-2 Depression Screening  Little interest or pleasure in doing things? 1-->several days   Feeling down, depressed, or hopeless? 1-->several days   PHQ-2 Total Score 11      Result Review :              Assessment and Plan     Diagnoses and all orders for this visit:    1. Type 2 diabetes mellitus without complication, with long-term current use of insulin (Primary)  Check A1C today for monitoring.  Continue to check blood sugars fasting to titrate insulin.  Continue levemir 47 units bid and metformin 1000 mg bid.  Check blood counts with report of hx of anemia and fatigue.    If A1C above goal of 7 consider adding sulfonylurea d/t affordability.  I am sending in referral to social care services to help see if she qualifies for medicaid or other services.  -     Hemoglobin A1c  -     metFORMIN (GLUCOPHAGE) 1000 MG tablet; Take 1 tablet by mouth 2 (Two) Times a Day.  Dispense: 60 tablet; Refill: 6  -     CBC (No Diff)    2. Primary hypertension  BP slightly elevated systolic, continue to monitor, continue lisinopril 20 mg daily.  -     lisinopril (PRINIVIL,ZESTRIL) 20 MG tablet; Take 1 tablet by mouth Daily.  Dispense: 30 tablet; Refill: 6    3. Nasal congestion  Trial azelastine nasal spray.  -     azelastine (ASTEPRO) 0.15 % solution nasal spray; 2 sprays into the nostril(s) as directed by provider Daily.  Dispense: 30 mL; Refill: 1    4. Does not have health insurance  -     Ambulatory Referral to Social Care Services (Amb Case Mgmt)      Follow Up     Return in about 6 months (around 2/29/2024) for T2DM f/u.

## 2023-08-31 ENCOUNTER — REFERRAL TRIAGE (OUTPATIENT)
Dept: CASE MANAGEMENT | Facility: CLINIC | Age: 58
End: 2023-08-31
Payer: MEDICAID

## 2023-09-05 ENCOUNTER — TELEPHONE (OUTPATIENT)
Dept: FAMILY MEDICINE CLINIC | Facility: CLINIC | Age: 58
End: 2023-09-05
Payer: MEDICAID

## 2023-09-05 NOTE — TELEPHONE ENCOUNTER
HUB TO SHARE          Brody Ashford DO sent to Lupe Meadows MA  Please let Mrs Bridges know the following regarding her blood work: Her blood counts were normal so no sign of anemia.  Her A1c did come back at 9.6.  I would recommend adding a medication called glimepiride which will be affordable.  We will start at 2 mg once a day.  Will be very important for her to cut back on carbohydrates in her diet as well including simple sugars such as soda, sweets and white breads.  I would like to see her back in 3 months to repeat that A1c.

## 2023-09-29 ENCOUNTER — PATIENT OUTREACH (OUTPATIENT)
Dept: CASE MANAGEMENT | Facility: CLINIC | Age: 58
End: 2023-09-29
Payer: MEDICAID

## 2023-09-29 NOTE — OUTREACH NOTE
SW received referral via PCP re: insurance concerns. SW attempted to reach patient x3 with no success. Please re consult SW if additional needs arise.    Trudi ARREDONDO -   Ambulatory Case Management    9/29/2023, 08:20 EDT

## 2023-10-12 ENCOUNTER — PATIENT MESSAGE (OUTPATIENT)
Dept: FAMILY MEDICINE CLINIC | Facility: CLINIC | Age: 58
End: 2023-10-12
Payer: MEDICAID

## 2023-11-05 RX ORDER — ATENOLOL 25 MG/1
TABLET ORAL
Qty: 30 TABLET | Refills: 11 | Status: SHIPPED | OUTPATIENT
Start: 2023-11-05

## 2023-12-13 RX ORDER — INSULIN DETEMIR 100 [IU]/ML
INJECTION, SOLUTION SUBCUTANEOUS
Qty: 30 ML | Refills: 3 | Status: SHIPPED | OUTPATIENT
Start: 2023-12-13

## 2023-12-13 NOTE — TELEPHONE ENCOUNTER
Caller: Lupe Bridges    Relationship: Self    Best call back number: [phone]    Requested Prescriptions:   Requested Prescriptions     Pending Prescriptions Disp Refills    insulin detemir (Levemir FlexPen) 100 UNIT/ML injection 30 mL 3        Pharmacy where request should be sent: [id number], [place] - [address] - [phone] PH - [phone] FX     Last office visit with prescribing clinician: 8/30/2023   Last telemedicine visit with prescribing clinician: Visit date not found   Next office visit with prescribing clinician: Visit date not found     Additional details provided by patient: PATIENT IS OUT, AND REQUESTED THIS ORIGINALLY  ON 12/11/23    Does the patient have less than a 3 day supply:   Yes  [] No        Lara Avendano Rep   12/13/23 12:34 EST

## 2024-01-08 ENCOUNTER — APPOINTMENT (OUTPATIENT)
Dept: GENERAL RADIOLOGY | Facility: HOSPITAL | Age: 59
End: 2024-01-08
Payer: COMMERCIAL

## 2024-01-08 ENCOUNTER — HOSPITAL ENCOUNTER (EMERGENCY)
Facility: HOSPITAL | Age: 59
Discharge: HOME | End: 2024-01-08
Attending: EMERGENCY MEDICINE | Admitting: EMERGENCY MEDICINE
Payer: COMMERCIAL

## 2024-01-08 VITALS
WEIGHT: 258.38 LBS | RESPIRATION RATE: 18 BRPM | TEMPERATURE: 98.1 F | HEIGHT: 70 IN | BODY MASS INDEX: 36.99 KG/M2 | OXYGEN SATURATION: 96 % | DIASTOLIC BLOOD PRESSURE: 80 MMHG | HEART RATE: 80 BPM | SYSTOLIC BLOOD PRESSURE: 150 MMHG

## 2024-01-08 DIAGNOSIS — M46.1 SI (SACROILIAC) JOINT INFLAMMATION: Primary | ICD-10-CM

## 2024-01-08 DIAGNOSIS — M54.50 ACUTE LEFT-SIDED LOW BACK PAIN, UNSPECIFIED WHETHER SCIATICA PRESENT: ICD-10-CM

## 2024-01-08 PROCEDURE — 25010000002 KETOROLAC TROMETHAMINE PER 15 MG

## 2024-01-08 PROCEDURE — 96372 THER/PROPH/DIAG INJ SC/IM: CPT

## 2024-01-08 PROCEDURE — 99283 EMERGENCY DEPT VISIT LOW MDM: CPT

## 2024-01-08 PROCEDURE — 72110 X-RAY EXAM L-2 SPINE 4/>VWS: CPT

## 2024-01-08 PROCEDURE — 97162 PT EVAL MOD COMPLEX 30 MIN: CPT | Performed by: PHYSICAL THERAPIST

## 2024-01-08 RX ORDER — KETOROLAC TROMETHAMINE 30 MG/ML
30 INJECTION, SOLUTION INTRAMUSCULAR; INTRAVENOUS ONCE
Status: COMPLETED | OUTPATIENT
Start: 2024-01-08 | End: 2024-01-08

## 2024-01-08 RX ORDER — LIDOCAINE 50 MG/G
1 PATCH TOPICAL
Status: DISCONTINUED | OUTPATIENT
Start: 2024-01-08 | End: 2024-01-08 | Stop reason: HOSPADM

## 2024-01-08 RX ORDER — METHOCARBAMOL 750 MG/1
750 TABLET, FILM COATED ORAL ONCE
Status: COMPLETED | OUTPATIENT
Start: 2024-01-08 | End: 2024-01-08

## 2024-01-08 RX ORDER — METHOCARBAMOL 750 MG/1
750 TABLET, FILM COATED ORAL 3 TIMES DAILY PRN
Qty: 16 TABLET | Refills: 0 | Status: SHIPPED | OUTPATIENT
Start: 2024-01-08

## 2024-01-08 RX ORDER — IBUPROFEN 800 MG/1
800 TABLET ORAL EVERY 8 HOURS PRN
Qty: 20 TABLET | Refills: 0 | Status: SHIPPED | OUTPATIENT
Start: 2024-01-08

## 2024-01-08 RX ADMIN — LIDOCAINE 1 PATCH: 50 PATCH CUTANEOUS at 10:40

## 2024-01-08 RX ADMIN — METHOCARBAMOL 750 MG: 750 TABLET ORAL at 10:41

## 2024-01-08 RX ADMIN — KETOROLAC TROMETHAMINE 30 MG: 30 INJECTION INTRAMUSCULAR; INTRAVENOUS at 10:41

## 2024-01-08 NOTE — DISCHARGE INSTRUCTIONS
Prescription for muscle relaxer sent to your preferred pharmacy use as directed  Anti-inflammatories including Motrin Aleve or ibuprofen as directed.    Follow PT recommended exercises follow-up outpatient    Follow-up PCP    Return to the ER for any worsening symptoms

## 2024-01-08 NOTE — THERAPY EVALUATION
Patient Name: Lupe Bridges  : 1965    MRN: [id number]                              Today's Date: 2024       Admit Date: 2024    Visit Dx:     ICD-10-CM ICD-9-CM   1. SI (sacroiliac) joint inflammation  M46.1 720.2   2. Acute left-sided low back pain, unspecified whether sciatica present  M54.50 724.2     Patient Active Problem List   Diagnosis    Anxiety    Family history of malignant neoplasm of breast    Family history of lung cancer    Hypertension    Need for other prophylactic vaccination and inoculation against single diseases    Type 2 diabetes mellitus    Anemia    Encounter for general adult medical examination without abnormal findings    Gynecologic exam normal    Major depressive disorder, recurrent episode, moderate degree    Arthritis    Acute non-recurrent maxillary sinusitis    Chronic left shoulder pain    Breast cancer screening by mammogram    Screen for colon cancer    Screening for colon cancer    Polyp of cervix    Acute bronchitis     Past Medical History:   Diagnosis Date    Allergic     Anemia     Ankle sprain     Anxiety     Arrhythmia     Arthritis     Bronchitis, acute     Chest pain     Colitis     Diabetes mellitus, type II     Hypertension     Obesity     RAD (reactive airway disease)     Shortness of breath     Stress disorder, acute      Past Surgical History:   Procedure Laterality Date     SECTION     SUBJECTIVE:  Pt reporting low back pain that has been worsening since she woke up yesterday. 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     OBJECTIVE:    AROM - minimal lumbar mobility in any plane d/t pain   MMT LE - pain with left knee flex MMT, 4-/5 grossly BLE   SPECIAL TESTING   Passive straight leg raise - negative    Quadrant test - positive left    90/90 test - negative    Slump - negative    Repeated flexion / repeated extension - positive for dec pain with  rep flexion      SIJ TESTING   Compression - positive    Distraction - positive    Gillettes - positive left for ant ilial rot    Posterior ilial rotation - dec pain    Anterior ilial rotation  - inc pain   SENSATION  -normal   REFLEXES - normal     ASSESSMENT:   Pt presents with a diagnosis of (L) quadratus strain and left anterior ilial rotation, and has pain and mobility limitations that are limiting her ability to perform ADLs. She tolerated treatment for both areas well today, and would benefit from skilled outpatient PT for additional manual therapy and core/hip strength. Pt in agreement.      Goals:   LTG 1: The patient will be independent in HEP in order to decrease pain and improve tolerance to functional activities.  STATUS: Met    Interventions:   Manual Therapy: STM quadratus and posterior ilial rotation mobilization Gr II     Therapeutic Exercises: isometric hamstring, SKTC stretch, LTR stretch     Modalities: none       PLAN:    Home with OPPT      Time Calculation:   PT Evaluation Complexity  History, PT Evaluation Complexity: 1-2 personal factors and/or comorbidities  Examination of Body Systems (PT Eval Complexity): total of 3 or more elements  Clinical Presentation (PT Evaluation Complexity): evolving  Clinical Decision Making (PT Evaluation Complexity): moderate complexity  Overall Complexity (PT Evaluation Complexity): moderate complexity     PT Charges       Row Name 01/08/24 1812             Time Calculation    Start Time 1154  -AD      Stop Time 1213  -AD      Time Calculation (min) 19 min  -AD      PT Received On 01/08/24  -AD         Time Calculation- PT    Total Timed Code Minutes- PT 0 minute(s)  -AD                User Key  (r) = Recorded By, (t) = Taken By, (c) = Cosigned By      Initials Name Provider Type    Keisha Jackson, PT Physical Therapist                  Therapy Charges for Today       Code Description Service Date Service Provider Modifiers Qty    11472503981 HC PT EVAL  MOD COMPLEXITY 4 1/8/2024 Keisha Nolen, PT GP 1               PT Discharge Summary  Anticipated Discharge Disposition (PT): home with outpatient therapy services    Keisha Nolen, PT  1/8/2024

## 2024-01-08 NOTE — PLAN OF CARE
ASSESSMENT:   Pt presents with a diagnosis of (L) quadratus strain and left anterior ilial rotation, and has pain and mobility limitations that are limiting her ability to perform ADLs. She tolerated treatment for both areas well today, and would benefit from skilled outpatient PT for additional manual therapy and core/hip strength. Pt in agreement.      Goals:   LTG 1: The patient will be independent in HEP in order to decrease pain and improve tolerance to functional activities.  STATUS: Met    Interventions:   Manual Therapy: STM quadratus and posterior ilial rotation mobilization Gr II     Therapeutic Exercises: isometric hamstring, SKTC stretch, LTR stretch     Modalities: none       PLAN:    Home with OPPT

## 2024-01-08 NOTE — ED PROVIDER NOTES
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  She has had no saddle anesthesia no lower extremity numbness or tingling no urinary or bowel retention or incontinence, no dysuria or hematuria.  Worse with certain movements attempted treatment with Tylenol.    PCP: Makeda    History provided by:  Patient      Review of Systems   Musculoskeletal:  Positive for back pain.       Past Medical History:   Diagnosis Date    Allergic     Anemia     Ankle sprain     Anxiety     Arrhythmia     Arthritis     Bronchitis, acute     Chest pain     Colitis     Diabetes mellitus, type II     Hypertension     Obesity     RAD (reactive airway disease)     Shortness of breath     Stress disorder, acute        Allergies   Allergen Reactions    Levofloxacin Nausea And Vomiting    Penicillins Nausea And Vomiting       Past Surgical History:   Procedure Laterality Date     SECTION         Family History   Problem Relation Age of Onset    Breast cancer Sister     Uterine cancer Sister     Cancer Sister     Diabetes Sister     Hyperlipidemia Sister     Lung cancer Other         MESOTHIOLOMA    Brain cancer Other         MET FROM LUNG    Breast cancer Other     Breast cancer Sister     Uterine cancer Sister     Cancer Sister     Diabetes Sister     Uterine cancer Sister     Cancer Sister     Cancer Mother     Diabetes Father     Heart disease Father     Hyperlipidemia Father     Diabetes Paternal Grandmother        Social History     Socioeconomic History    Marital status:    Tobacco Use    Smoking status: Former     Packs/day: 3.00     Years: 20.00     Additional pack years: 0.00     Total pack years: 60.00     Types:  "Cigarettes     Quit date: 1988     Years since quittin.0    Smokeless tobacco: Never   Vaping Use    Vaping Use: Never used   Substance and Sexual Activity    Alcohol use: Not Currently    Drug use: Not Currently    Sexual activity: Not Currently     Partners: Male     Birth control/protection: None           Objective   Physical Exam  Vitals reviewed.   Constitutional:       Appearance: She is obese.   HENT:      Head: Normocephalic.   Eyes:      Extraocular Movements: Extraocular movements intact.      Pupils: Pupils are equal, round, and reactive to light.   Cardiovascular:      Rate and Rhythm: Normal rate.      Pulses: Normal pulses.   Abdominal:      General: Bowel sounds are normal.      Palpations: Abdomen is soft.      Tenderness: There is no abdominal tenderness.   Musculoskeletal:      Cervical back: Normal range of motion.      Lumbar back: Bony tenderness present. Positive left straight leg raise test.      Comments: Lumbar midline tenderness, no bony step-offs or crepitus paraspinal discomfort on palpation bilaterally worse to the left.  Bilateral lower extremity strength equal, strong   Skin:     General: Skin is warm and dry.   Neurological:      General: No focal deficit present.      Mental Status: She is alert and oriented to person, place, and time.         Procedures           ED Course  ED Course as of 24 1215      1154 PT at bedside for evaluation [BH]      ED Course User Index  [] Josy Goodson, JADE      /80   Pulse 80   Temp 98.1 °F (36.7 °C) (Oral)   Resp 20   Ht 177.8 cm (70\")   Wt 117 kg (258 lb 6.1 oz)   SpO2 96%   BMI 37.07 kg/m²   Labs Reviewed - No data to display  Medications   lidocaine (LIDODERM) 5 % 1 patch (1 patch Transdermal Medication Applied 24 1040)   methocarbamol (ROBAXIN) tablet 750 mg (750 mg Oral Given 24 1041)   ketorolac (TORADOL) injection 30 mg (30 mg Intramuscular Given 24 1041)     XR Spine Lumbar " Complete 4+VW    Result Date: 1/8/2024  Impression: Multilevel mild to moderate lumbar degenerative disc disease and facet arthropathy. No acute findings. Electronically Signed: Lucille Fine MD  1/8/2024 11:18 AM EST  Workstation ID: [id number]                                          Medical Decision Making  Patient presented with above complaints x-rays were obtained, which noted degenerative disc disease, with her known history of diabetes steroids were avoided.  She was given a dose of Toradol lidocaine patch and a muscle relaxer which reportedly improved her symptoms PT completed bedside evaluation which notes a SI joint inflammation, and recommended outpatient PT.  Prescriptions for Robaxin and ibuprofen 800s were sent to her preferred pharmacy she was given the information to follow-up outpatient.  We discussed worrisome symptoms to monitor for as well as nonpharmacologic treatment of her symptoms.  Patient gave verbal understanding was agreeable to this plan of care.  She was alert oriented with improved symptoms at time of discharge, denied further questions or complaints.    Differentials considered but not all-inclusive: Sciatica, lumbar strain, cauda equina, DDD    Chart review: 8/30/2023 outpatient visit with PCP related to chronic medical problems      Note Disclaimer: At [facility], we believe that sharing information builds trust and better  relationships. You are receiving this note because you recently visited [facility]. It is possible you will see health information before a provider has talked with you about it. This kind of information can be easy to misunderstand. To help you fully understand what it means for your health, we urge you to discuss this note with your provider.       Part of this note may be an electronic transcription/translation of spoken language to printed text using the Dragon Dictation System.    Appropriate PPE worn during exam.    Problems Addressed:  Acute  left-sided low back pain, unspecified whether sciatica present: complicated acute illness or injury  SI (sacroiliac) joint inflammation: complicated acute illness or injury    Amount and/or Complexity of Data Reviewed  Radiology: ordered.    Risk  Prescription drug management.        Final diagnoses:   SI (sacroiliac) joint inflammation   Acute left-sided low back pain, unspecified whether sciatica present       ED Disposition  ED Disposition       ED Disposition   Discharge    Condition   Stable    Comment   --               [facility] PHYSICAL [facility]ER[facility]  [address] [facility] [address]  [phone]  Call in 1 week      Brody Ashford, DO  [address]  [place] [address]  [phone]               Medication List        New Prescriptions      ibuprofen 800 MG tablet  Commonly known as: ADVIL,MOTRIN  Take 1 tablet by mouth Every 8 (Eight) Hours As Needed for Mild Pain.     methocarbamol 750 MG tablet  Commonly known as: ROBAXIN  Take 1 tablet by mouth 3 (Three) Times a Day As Needed for Muscle Spasms.               Where to Get Your Medications        These medications were sent to [facility] [id number] - [place], [place] - [address] [facility] - [phone]  - [phone] FX  [address] [facility] [place] [address]      Phone: [phone]   ibuprofen 800 MG tablet  methocarbamol 750 MG tablet            Josy Goodson, JADE  01/08/24 5740

## 2024-01-12 ENCOUNTER — TREATMENT (OUTPATIENT)
Dept: PHYSICAL THERAPY | Facility: CLINIC | Age: 59
End: 2024-01-12
Payer: COMMERCIAL

## 2024-01-12 DIAGNOSIS — M54.50 CHRONIC LOW BACK PAIN, UNSPECIFIED BACK PAIN LATERALITY, UNSPECIFIED WHETHER SCIATICA PRESENT: ICD-10-CM

## 2024-01-12 DIAGNOSIS — G89.29 CHRONIC LOW BACK PAIN, UNSPECIFIED BACK PAIN LATERALITY, UNSPECIFIED WHETHER SCIATICA PRESENT: ICD-10-CM

## 2024-01-12 DIAGNOSIS — M46.1 SACROILIITIS, NOT ELSEWHERE CLASSIFIED: Primary | ICD-10-CM

## 2024-01-12 PROCEDURE — 97162 PT EVAL MOD COMPLEX 30 MIN: CPT | Performed by: PHYSICAL THERAPIST

## 2024-01-12 PROCEDURE — 97535 SELF CARE MNGMENT TRAINING: CPT | Performed by: PHYSICAL THERAPIST

## 2024-01-12 PROCEDURE — 97110 THERAPEUTIC EXERCISES: CPT | Performed by: PHYSICAL THERAPIST

## 2024-01-12 NOTE — PROGRESS NOTES
Physical Therapy Initial Evaluation and Plan of Care    [address] [facility] [place] [address]  [phone] (p)  [phone] (f)    Patient: Lupe Bridges   : 1965  Diagnosis/ICD-10 Code:  Sacroiliitis, not elsewhere classified [M46.1]  M54.50 (ICD-10-CM) - Acute left-sided low back pain, unspecified whether sciatica present   Referring practitioner: Brody Ashford DO  Date of Initial Visit: 2024  Today's Date: 1/15/2024  Patient seen for 1 sessions         Subjective Questionnaire: Oswestry:  = 40% dysfunction      Subjective     Pt presents to PT with c/o L lower back pain. Began insidiously ~ 2 weeks ago, noticed when she woke up in AM. She reports intermittent back issues in the past. She reports PLOF includes a lot of physical work daily, cares for 35 daughter who is disabled including  transfers. She tries to brace on doorframe etc to assist. She went to ER because she couldnt standing up on her own and had too much sharp pain. She had xray, and given pain medicine and muscle relaxer. No steroids. Does not tolerate heat, did not try ice. Has SI belt but rides up too much. She also had SI treatment and HEP provided. She notes pain with knee to chest L but better afterwards. She does them 2x per day. She spends most of the day in recliner, feels better in recliner. Had been sleeping in recliner since surgery, but now back in bed. Pain with transition movements sit to stand, in/out of car, in/out of bed, rolling in bed. Worse with walking, standing more than 10 mins.  helping currently with don/dof shoes and socks, cooking, cleaning, laundry, and care tasks for daughter. Sleeps well. She feels is getting a little better, pain tight and achey, less sharp. L low back sometimes center. Denies radiating LE pain, denies N/T, denies B/B changes. Denies thigh pain. Doing a little better today until sitting in waiting room in straight back chair today. She is no longer requiring  help with sit to stand the past few days. She is now able to get in/out of shower; has grab bars in shower.  Driving is difficult, limited to short distances. She has flight of stairs to 2nd floor but does not usually access, has ramp to main floor. She has bilat knee arthritis L>R that affects her walking, stairs, squatting, lunging. Limps but does not use AD. Limited to short distance/home/appts walking only at this time.     Pain 5/10 (3/10 to 9/10) L low back    Narrative & Impression   XR SPINE LUMBAR COMPLETE 4+VW     Date of Exam: 1/8/2024 10:47 AM EST   IMPRESSION:  Impression:  Multilevel mild to moderate lumbar degenerative disc disease and facet arthropathy. No acute findings.      Med hx: see epic, anxiety, arrhthmia, arthritis multiple joints, bronchitis, chest pain, DM2, HTN, obeisty (BMI 37), RAD, stress, shortness of breath, depression    Objective          Postural Observations  Seated posture: fair  Standing posture: fair    Additional Postural Observation Details  Sitting: slouched sitting, rounded, sacral sitting, one LE extended.    Sit to stand: mod use of UE, slow transition to upright, mildly flexed posture    R scap higher, APT, L genu varus , mild hip/knee flexion, pelvis appears level.    Ambulation: antalgia, decreased stance time L, hip drop stance L>R, lack of hip ext, limited arm swing, arms wide for stability. Slow gait from waiting room to treatment room ~80 ft.      Palpation     Additional Palpation Details  Mild tenderness L quadratus, paraspinals, transverse process L3-5, mild at PSIS, sacrum, coxxyx    Neurological Testing     Sensation     Lumbar   Left   Intact: light touch    Right   Intact: light touch    Active Range of Motion     Additional Active Range of Motion Details  Hip ER sitting: R to mid shin mild discomfort L low back, L to mid shin mild discomfort L low back    Lumbar AROM: guarded all directions, not tested to end range.    Ext unable to achieve full  upright.      Passive Range of Motion     Additional Passive Range of Motion Details  L hip ext -22, ER 50 tight hip, IR 0 painfree, flex 90 mildly worse L low back  L knee flex 100, ext -19 (pt notes painfree pop L PSIS with transition movement L LE)     R hip ER 45, flex ~90 better, IR ~5, R hip ext not tested.  R knee flex 100, ext -15,     Strength/Myotome Testing     Additional Strength Details  Strength: 4/5 throughout, mild discomfort hip abd and hip flex L at L low back, otherwise painfree sitting. Sidelying hip abd L 3/5 with TFL compensation, R not tested, hip ext not tested.    Weak TA. Weak gluts.      Tests       Thoracic   Negative slump.     Additional Tests Details  Lumbar distraction not tested  + L ganslens  SAIRA not tested  POSH not tested    Lumbar Flexibility Comments:   Mod hip flexor restriction L and R not tested, mod hamstring restriction L>R, quad not tested, mod piriformis restriction bilat, gastroc not tested.     General Comments     Lumbar Comments  Bed mobility: independent sit to supine with pain and mild difficulty, log roll. Supine to sidelying independent with pain and mild difficutly. Min A for sidelying to sit. Pt requires cues for log roll vs. Looking heel on edge of bed for sit up pattern.    Supine: hip ER bilat, hip flexor contracture, limited TKE bilat.         Assessment & Plan       Assessment  Impairments: abnormal gait, abnormal or restricted ROM, activity intolerance, impaired balance, impaired physical strength, lacks appropriate home exercise program, pain with function and weight-bearing intolerance   Functional limitations: carrying objects, lifting, walking, pulling, pushing, uncomfortable because of pain, moving in bed, sitting, standing and stooping   Assessment details: Pt presents to PT with symptoms consistent with lumbosacral pain, which began ~ 2 weeks ago. Mildly improved, still limits functional and positional tolerance. PLOF includes independence with  ADLs and caring tasks (including transfers) for 35 daughter who is disabled. She voices knowledge of lifting mechanics to protect back but limited due to knee pain and decreased ROM.  Pt is appropriate for the skilled PT interventions to address the deficits noted above; has the potential to benefit from PT.     Prognosis: good    Goals  Plan Goals: SHORT TERM GOALS:  1.  Patient to be compliant w/ the HEP and tolerate progression in 1 week.      2.  Pain level < 5/10 at worst with mentioned activities to improve function in 4 weeks  3.  Increased lumbar AROM to by 25% flexion and extension to allow for increased ease with sit-stand transfers and functional activities in 4 weeks  4. Independent TA contraction with perturbation in 4 weeks  5. Pt to demonstrate hip ext to -5 or better for standing and walking     LONG TERM GOALS:  1.  Oswestry to show significant improvement and increased functional tolerance by D/C  2.  Pain level < 3/10 with all listed activities to return to normal by D/C  3.  Lumbar AROM to WFL all direction to allow for return to household, & recreational  & caregiving activities w/o increase in symptoms.  4.  Pt to voice readiness for D/C to independent HEP and self management of symptoms  5. Pt to demonstrate sitting hip ER 60 degrees or better for independence with don/dof shoes and socks by D/C          Plan  Therapy options: will be seen for skilled therapy services  Planned modality interventions: electrical stimulation/Russian stimulation, cryotherapy, TENS, traction, ultrasound, thermotherapy (hydrocollator packs) and dry needling  Other planned modality interventions: aquatic as needed  Planned therapy interventions: manual therapy, abdominal trunk stabilization, ADL retraining, neuromuscular re-education, spinal/joint mobilization, soft tissue mobilization, strengthening, stretching, therapeutic activities, functional ROM exercises, home exercise program, flexibility, body mechanics  training and postural training  Frequency: 2x week  Duration in visits: 20  Duration in weeks: 12  Treatment plan discussed with: patient        History # of Personal Factors and/or Comorbidities: HIGH (3+)  Examination of Body System(s): # of elements: LOW (1-2)  Clinical Presentation: EVOLVING  Clinical Decision Making: MODERATE    Timed:         Manual Therapy:         mins  20478;     Therapeutic Exercise:   10     mins  66067;     Neuromuscular Bessy:        mins  09372;    Therapeutic Activity:          mins  59970;     Gait Trainin      mins  22018;     Ultrasound:          mins  35802;    Ionto                                   mins   19985  Self Care                        10    mins   45608  Aquatic                               mins 36367      Un-Timed:  Electrical Stimulation:         mins  71935 ( );  Dry Needling          mins self-pay  Traction          mins 00894  Low Eval          Mins  60124  Mod Eval    35     Mins  17523  High Eval                            Mins  80577  Re-Eval                               mins  95731        Timed Treatment:   25   mins   Total Treatment:    60    mins    PT SIGNATURE: Mariana Pruitt PT, DPT   IN LICENCE: [id number]  DATE TREATMENT INITIATED: 2024    Initial Certification  Certification Period:4/10/2024  I certify that the therapy services are furnished while this patient is under my care.  The services outlined above are required by this patient, and will be reviewed every 90 days.     PHYSICIAN: Brody Ashford, DO      DATE:     Please sign and return via fax to [phone].. Thank you, [facility] Physical Therapy.

## 2024-01-22 ENCOUNTER — TELEPHONE (OUTPATIENT)
Dept: PHYSICAL THERAPY | Facility: OTHER | Age: 59
End: 2024-01-22
Payer: COMMERCIAL

## 2024-01-22 NOTE — TELEPHONE ENCOUNTER
Caller: Lupe Bridges    Relationship: Self    What was the call regarding: PATIENT CANCELLED APPT TODAY DUE TO NOT FEELING WELL

## 2024-02-19 RX ORDER — SERTRALINE HYDROCHLORIDE 100 MG/1
TABLET, FILM COATED ORAL
Qty: 180 TABLET | Refills: 3 | Status: SHIPPED | OUTPATIENT
Start: 2024-02-19

## 2024-03-25 ENCOUNTER — DOCUMENTATION (OUTPATIENT)
Dept: PHYSICAL THERAPY | Facility: CLINIC | Age: 59
End: 2024-03-25
Payer: COMMERCIAL

## 2024-03-25 NOTE — PROGRESS NOTES
Discharge Summary  Discharge Summary from Physical Therapy Report    Lupe Bridges  [birth year]    Dates  PT visit: 1/12/24  Number of Visits: 1     Discharge Status of Patient: Pt came for eval only. Cancelled remaining visits and did not call back to reschedule. D/C per clinic attendance policy at this time.    Goals: Not Met    Discharge Plan: Continue with current home exercise program as instructed  Patient to return to referring/providing physician    Date of Discharge 3/25/24    Mariana Pruitt, PT, DPT  Physical Therapist

## 2024-04-08 DIAGNOSIS — I10 PRIMARY HYPERTENSION: ICD-10-CM

## 2024-04-08 DIAGNOSIS — Z79.4 TYPE 2 DIABETES MELLITUS WITHOUT COMPLICATION, WITH LONG-TERM CURRENT USE OF INSULIN: ICD-10-CM

## 2024-04-08 DIAGNOSIS — E11.9 TYPE 2 DIABETES MELLITUS WITHOUT COMPLICATION, WITH LONG-TERM CURRENT USE OF INSULIN: ICD-10-CM

## 2024-04-08 RX ORDER — LISINOPRIL 20 MG/1
20 TABLET ORAL DAILY
Qty: 90 TABLET | Refills: 0 | Status: SHIPPED | OUTPATIENT
Start: 2024-04-08

## 2024-04-22 RX ORDER — INSULIN DETEMIR 100 [IU]/ML
INJECTION, SOLUTION SUBCUTANEOUS
Qty: 30 ML | Refills: 3 | Status: SHIPPED | OUTPATIENT
Start: 2024-04-22

## 2024-07-07 DIAGNOSIS — E11.9 TYPE 2 DIABETES MELLITUS WITHOUT COMPLICATION, WITH LONG-TERM CURRENT USE OF INSULIN: ICD-10-CM

## 2024-07-07 DIAGNOSIS — Z79.4 TYPE 2 DIABETES MELLITUS WITHOUT COMPLICATION, WITH LONG-TERM CURRENT USE OF INSULIN: ICD-10-CM

## 2024-07-07 DIAGNOSIS — I10 PRIMARY HYPERTENSION: ICD-10-CM

## 2024-07-08 RX ORDER — LISINOPRIL 20 MG/1
20 TABLET ORAL DAILY
Qty: 90 TABLET | Refills: 0 | Status: SHIPPED | OUTPATIENT
Start: 2024-07-08

## 2024-08-29 RX ORDER — INSULIN DETEMIR 100 [IU]/ML
INJECTION, SOLUTION SUBCUTANEOUS
Qty: 30 ML | Refills: 3 | Status: SHIPPED | OUTPATIENT
Start: 2024-08-29

## 2024-09-06 RX ORDER — ATENOLOL 25 MG/1
25 TABLET ORAL EVERY EVENING
Qty: 30 TABLET | Refills: 11 | OUTPATIENT
Start: 2024-09-06

## 2024-09-06 NOTE — TELEPHONE ENCOUNTER
Caller: Lupe Bridges    Relationship: Self    Requested Prescriptions:   Requested Prescriptions     Pending Prescriptions Disp Refills    atenolol (TENORMIN) 25 MG tablet 30 tablet 11     Sig: Take 1 tablet by mouth Every Evening.      Pharmacy where request should be sent: [id number] [place], [place] - [address] - [phone] PH - [phone] FX     Last office visit with prescribing clinician: 8/30/2023   Last telemedicine visit with prescribing clinician: Visit date not found   Next office visit with prescribing clinician: Visit date not found     Additional details provided by patient: PATIENT STATES PASQUALE REQUESTED THIS FROM HER PCP 2 DAYS AGO AND NOW THE PATIENT IS OUT OF THE MEDICATION.      Does the patient have less than a 3 day supply:  [x] Yes  [] No    Would you like a call back once the refill request has been completed: [] Yes [x] No    If the office needs to give you a call back, can they leave a voicemail: [] Yes [x] No    Lara Majano   09/06/24 09:43 EDT

## 2024-09-07 DIAGNOSIS — I10 PRIMARY HYPERTENSION: Primary | ICD-10-CM

## 2024-09-07 RX ORDER — ATENOLOL 25 MG/1
25 TABLET ORAL EVERY EVENING
Qty: 30 TABLET | Refills: 11 | Status: SHIPPED | OUTPATIENT
Start: 2024-09-07

## 2024-09-28 DIAGNOSIS — Z79.4 TYPE 2 DIABETES MELLITUS WITHOUT COMPLICATION, WITH LONG-TERM CURRENT USE OF INSULIN: ICD-10-CM

## 2024-09-28 DIAGNOSIS — E11.9 TYPE 2 DIABETES MELLITUS WITHOUT COMPLICATION, WITH LONG-TERM CURRENT USE OF INSULIN: ICD-10-CM

## 2024-09-28 DIAGNOSIS — I10 PRIMARY HYPERTENSION: ICD-10-CM

## 2024-09-30 RX ORDER — LISINOPRIL 20 MG/1
20 TABLET ORAL DAILY
Qty: 90 TABLET | Refills: 0 | Status: SHIPPED | OUTPATIENT
Start: 2024-09-30

## 2024-10-29 ENCOUNTER — TELEPHONE (OUTPATIENT)
Dept: FAMILY MEDICINE CLINIC | Facility: CLINIC | Age: 59
End: 2024-10-29
Payer: COMMERCIAL

## 2024-10-29 DIAGNOSIS — E11.9 TYPE 2 DIABETES MELLITUS WITHOUT COMPLICATION, WITH LONG-TERM CURRENT USE OF INSULIN: Primary | ICD-10-CM

## 2024-10-29 DIAGNOSIS — Z79.4 TYPE 2 DIABETES MELLITUS WITHOUT COMPLICATION, WITH LONG-TERM CURRENT USE OF INSULIN: Primary | ICD-10-CM

## 2024-10-29 NOTE — TELEPHONE ENCOUNTER
Caller: Lupe Bridges    Relationship: Self    Best call back number: [phone]    What medication are you requesting: ALTERNATIVE FOR insulin detemir (Levemir FlexPen) 100 UNIT/ML injection    If a prescription is needed, what is your preferred pharmacy and phone number: [facility] [id number] - [place], [place] - [address] [facility] - [phone] [facility] [phone]      Additional notes:PATIENT STATED THAT HER PHARMACY INFORMED HER THAT LEVEMIR IS NOT BEING MANUFACTURED ANYMORE AND SHE ONLY HAS 2 PENS LEFT/A 2 DAY SUPPLY. PATIENT IS NEEDING AN ALTERNATIVE CALLED IN. PATIENT NOTED SHE CAN TOLERATE LANTUS IF DR MAIN NEEDS TO CALL THAT IN. PLEASE SEND NEW PRESCRIPTION AS SOON AS POSSIBLE AND LET PATIENT KNOW.

## 2024-10-30 NOTE — TELEPHONE ENCOUNTER
Name: Cyrus Lupe E      Relationship: Self      Best Callback Number: [phone]      HUB PROVIDED THE RELAY MESSAGE FROM THE OFFICE      PATIENT: VOICED UNDERSTANDING AND HAS NO FURTHER QUESTIONS AT THIS TIME    ADDITIONAL INFORMATION:

## 2025-01-02 DIAGNOSIS — I10 PRIMARY HYPERTENSION: ICD-10-CM

## 2025-01-02 DIAGNOSIS — Z79.4 TYPE 2 DIABETES MELLITUS WITHOUT COMPLICATION, WITH LONG-TERM CURRENT USE OF INSULIN: ICD-10-CM

## 2025-01-02 DIAGNOSIS — E11.9 TYPE 2 DIABETES MELLITUS WITHOUT COMPLICATION, WITH LONG-TERM CURRENT USE OF INSULIN: ICD-10-CM

## 2025-01-02 RX ORDER — LISINOPRIL 20 MG/1
20 TABLET ORAL DAILY
Qty: 90 TABLET | Refills: 0 | Status: SHIPPED | OUTPATIENT
Start: 2025-01-02

## 2025-02-23 DIAGNOSIS — F41.9 ANXIETY: Primary | ICD-10-CM

## 2025-02-24 RX ORDER — SERTRALINE HYDROCHLORIDE 100 MG/1
200 TABLET, FILM COATED ORAL DAILY
Qty: 90 TABLET | OUTPATIENT
Start: 2025-02-24

## 2025-02-24 RX ORDER — SERTRALINE HYDROCHLORIDE 100 MG/1
200 TABLET, FILM COATED ORAL DAILY
Qty: 60 TABLET | Refills: 0 | Status: SHIPPED | OUTPATIENT
Start: 2025-02-24

## 2025-02-24 NOTE — TELEPHONE ENCOUNTER
Advised pt of necessity of apptmt. Pt stated she did not have money for an apptmt right now, asked about a 30-day refill.

## 2025-02-24 NOTE — TELEPHONE ENCOUNTER
30 day refill sent in.  Last fill without an appointment.  She needs blood work for DM, so please schedule for 30 minute appointment when she schedules.

## 2025-03-15 DIAGNOSIS — F41.9 ANXIETY: ICD-10-CM

## 2025-03-17 RX ORDER — SERTRALINE HYDROCHLORIDE 100 MG/1
200 TABLET, FILM COATED ORAL DAILY
Qty: 60 TABLET | Refills: 0 | Status: SHIPPED | OUTPATIENT
Start: 2025-03-17 | End: 2025-03-20 | Stop reason: SDUPTHER

## 2025-03-20 ENCOUNTER — LAB (OUTPATIENT)
Dept: FAMILY MEDICINE CLINIC | Facility: CLINIC | Age: 60
End: 2025-03-20
Payer: COMMERCIAL

## 2025-03-20 ENCOUNTER — OFFICE VISIT (OUTPATIENT)
Dept: FAMILY MEDICINE CLINIC | Facility: CLINIC | Age: 60
End: 2025-03-20
Payer: COMMERCIAL

## 2025-03-20 VITALS
HEIGHT: 70 IN | DIASTOLIC BLOOD PRESSURE: 74 MMHG | SYSTOLIC BLOOD PRESSURE: 144 MMHG | OXYGEN SATURATION: 97 % | HEART RATE: 66 BPM | WEIGHT: 271.2 LBS | BODY MASS INDEX: 38.82 KG/M2

## 2025-03-20 DIAGNOSIS — Z79.4 TYPE 2 DIABETES MELLITUS WITHOUT COMPLICATION, WITH LONG-TERM CURRENT USE OF INSULIN: Primary | ICD-10-CM

## 2025-03-20 DIAGNOSIS — I10 PRIMARY HYPERTENSION: ICD-10-CM

## 2025-03-20 DIAGNOSIS — E11.9 TYPE 2 DIABETES MELLITUS WITHOUT COMPLICATION, WITH LONG-TERM CURRENT USE OF INSULIN: ICD-10-CM

## 2025-03-20 DIAGNOSIS — Z86.39 H/O IRON DEFICIENCY: ICD-10-CM

## 2025-03-20 DIAGNOSIS — Z79.4 TYPE 2 DIABETES MELLITUS WITHOUT COMPLICATION, WITH LONG-TERM CURRENT USE OF INSULIN: ICD-10-CM

## 2025-03-20 DIAGNOSIS — Z12.31 ENCOUNTER FOR SCREENING MAMMOGRAM FOR MALIGNANT NEOPLASM OF BREAST: ICD-10-CM

## 2025-03-20 DIAGNOSIS — Z12.11 SCREEN FOR COLON CANCER: ICD-10-CM

## 2025-03-20 DIAGNOSIS — F41.9 ANXIETY: ICD-10-CM

## 2025-03-20 DIAGNOSIS — E11.9 TYPE 2 DIABETES MELLITUS WITHOUT COMPLICATION, WITH LONG-TERM CURRENT USE OF INSULIN: Primary | ICD-10-CM

## 2025-03-20 LAB
ALBUMIN SERPL-MCNC: 4.2 G/DL (ref 3.5–5.2)
ALBUMIN/GLOB SERPL: 1.3 G/DL
ALP SERPL-CCNC: 107 U/L (ref 39–117)
ALT SERPL W P-5'-P-CCNC: 16 U/L (ref 1–33)
ANION GAP SERPL CALCULATED.3IONS-SCNC: 12.2 MMOL/L (ref 5–15)
AST SERPL-CCNC: 21 U/L (ref 1–32)
BASOPHILS # BLD AUTO: 0.04 10*3/MM3 (ref 0–0.2)
BASOPHILS NFR BLD AUTO: 0.5 % (ref 0–1.5)
BILIRUB SERPL-MCNC: 0.4 MG/DL (ref 0–1.2)
BUN SERPL-MCNC: 9 MG/DL (ref 8–23)
BUN/CREAT SERPL: 15.3 (ref 7–25)
CALCIUM SPEC-SCNC: 9.6 MG/DL (ref 8.6–10.5)
CHLORIDE SERPL-SCNC: 103 MMOL/L (ref 98–107)
CO2 SERPL-SCNC: 26.8 MMOL/L (ref 22–29)
CREAT SERPL-MCNC: 0.59 MG/DL (ref 0.57–1)
DEPRECATED RDW RBC AUTO: 41.5 FL (ref 37–54)
EGFRCR SERPLBLD CKD-EPI 2021: 102.7 ML/MIN/1.73
EOSINOPHIL # BLD AUTO: 0.14 10*3/MM3 (ref 0–0.4)
EOSINOPHIL NFR BLD AUTO: 1.8 % (ref 0.3–6.2)
ERYTHROCYTE [DISTWIDTH] IN BLOOD BY AUTOMATED COUNT: 13.1 % (ref 12.3–15.4)
GLOBULIN UR ELPH-MCNC: 3.3 GM/DL
GLUCOSE SERPL-MCNC: 204 MG/DL (ref 65–99)
HBA1C MFR BLD: 8.6 % (ref 4.8–5.6)
HCT VFR BLD AUTO: 37.5 % (ref 34–46.6)
HGB BLD-MCNC: 12.1 G/DL (ref 12–15.9)
IMM GRANULOCYTES # BLD AUTO: 0.03 10*3/MM3 (ref 0–0.05)
IMM GRANULOCYTES NFR BLD AUTO: 0.4 % (ref 0–0.5)
IRON 24H UR-MRATE: 74 MCG/DL (ref 37–145)
IRON SATN MFR SERPL: 16 % (ref 20–50)
LYMPHOCYTES # BLD AUTO: 2.42 10*3/MM3 (ref 0.7–3.1)
LYMPHOCYTES NFR BLD AUTO: 30.4 % (ref 19.6–45.3)
MCH RBC QN AUTO: 27.9 PG (ref 26.6–33)
MCHC RBC AUTO-ENTMCNC: 32.3 G/DL (ref 31.5–35.7)
MCV RBC AUTO: 86.6 FL (ref 79–97)
MONOCYTES # BLD AUTO: 0.44 10*3/MM3 (ref 0.1–0.9)
MONOCYTES NFR BLD AUTO: 5.5 % (ref 5–12)
NEUTROPHILS NFR BLD AUTO: 4.9 10*3/MM3 (ref 1.7–7)
NEUTROPHILS NFR BLD AUTO: 61.4 % (ref 42.7–76)
NRBC BLD AUTO-RTO: 0 /100 WBC (ref 0–0.2)
PLATELET # BLD AUTO: 406 10*3/MM3 (ref 140–450)
PMV BLD AUTO: 9.6 FL (ref 6–12)
POTASSIUM SERPL-SCNC: 5.2 MMOL/L (ref 3.5–5.2)
PROT SERPL-MCNC: 7.5 G/DL (ref 6–8.5)
RBC # BLD AUTO: 4.33 10*6/MM3 (ref 3.77–5.28)
SODIUM SERPL-SCNC: 142 MMOL/L (ref 136–145)
TIBC SERPL-MCNC: 466 MCG/DL (ref 298–536)
TRANSFERRIN SERPL-MCNC: 313 MG/DL (ref 200–360)
WBC NRBC COR # BLD AUTO: 7.97 10*3/MM3 (ref 3.4–10.8)

## 2025-03-20 PROCEDURE — 83036 HEMOGLOBIN GLYCOSYLATED A1C: CPT | Performed by: NURSE PRACTITIONER

## 2025-03-20 PROCEDURE — 84466 ASSAY OF TRANSFERRIN: CPT | Performed by: NURSE PRACTITIONER

## 2025-03-20 PROCEDURE — 83540 ASSAY OF IRON: CPT | Performed by: NURSE PRACTITIONER

## 2025-03-20 PROCEDURE — 80053 COMPREHEN METABOLIC PANEL: CPT | Performed by: NURSE PRACTITIONER

## 2025-03-20 PROCEDURE — 85025 COMPLETE CBC W/AUTO DIFF WBC: CPT | Performed by: NURSE PRACTITIONER

## 2025-03-20 PROCEDURE — 36415 COLL VENOUS BLD VENIPUNCTURE: CPT | Performed by: NURSE PRACTITIONER

## 2025-03-20 RX ORDER — SERTRALINE HYDROCHLORIDE 100 MG/1
200 TABLET, FILM COATED ORAL DAILY
Qty: 180 TABLET | Refills: 3 | Status: SHIPPED | OUTPATIENT
Start: 2025-03-20

## 2025-03-20 RX ORDER — ATENOLOL 25 MG/1
25 TABLET ORAL EVERY EVENING
Qty: 90 TABLET | Refills: 3 | Status: SHIPPED | OUTPATIENT
Start: 2025-03-20

## 2025-03-20 RX ORDER — LISINOPRIL 20 MG/1
20 TABLET ORAL DAILY
Qty: 90 TABLET | Refills: 3 | Status: SHIPPED | OUTPATIENT
Start: 2025-03-20

## 2025-03-20 NOTE — PROGRESS NOTES
Chief Complaint  Med Refill (And lab work )    Subjective          Lupe Bridges presents to [facility]  Diabetes  She has type 2 diabetes mellitus. No MedicAlert identification noted. The initial diagnosis of diabetes was made 20 years ago. Pertinent negatives for hypoglycemia include no confusion, headaches, speech difficulty, sweats or tremors. Associated symptoms include fatigue, foot paresthesias, polydipsia and polyuria. Pertinent negatives for diabetes include no chest pain, no foot ulcerations and no weight loss. Pertinent negatives for hypoglycemia complications include no blackouts, no hospitalization, no nocturnal hypoglycemia, no required assistance and no required glucagon injection. Symptoms are stable. She is compliant with treatment all of the time. She is currently taking insulin pre-lunch and at bedtime. Insulin injections are given by patient. Rotation sites for injection include the abdominal wall. She is following a generally healthy diet. Meal planning includes avoidance of concentrated sweets. She rarely participates in exercise. She monitors blood glucose at home 3-4 x per day. Her highest blood glucose is >200 mg/dl. She does not see a podiatrist. Eye exam is not current.     Ms. Bridges is a patient of Dr. Lara who is previously unknown to me, last OV 8/30/23    She presents today for med refills and labs    She has h/o anxiety, HTN, DM, anemia, depression, arthritis    Her current medicines are atenolol 25 mg, vit d3, ferrous sulfate, ibuprofen, lantus 47 units BID, lisinopril 20, metformin 1000 mg bid, robaxin, mvi, sertraline     She has never used meal time or sliding scale insulin  She has not used a CGM, thinks it would not work with her daughter who has CP - would likely grab at it    She has tried other medicines, glimepiride, maybe januvia, maybe tresiba, (is not certain of names of previously tried meds) but has not tolerated them due to GI  "upset    She is checking her sugars at home  She has had elevations in the past couple of day, up to 310 in the evening  Morning fasting sugars have been running 170s    Tries to stay away from the sugars, does not follow a diabetic diet, only drinks water and unsweet tea    She does not exercise    BP is elevated, she tells me that it commonly is when she is at the doctors office, elizabeth is currently taking all medicines as prescirbed  Denies any c/o cp, soa, edema, ha, dizziness, weakness    Mammogram is due, ordered today  DEXA has never been done, is agreeable to have one ordered  Colon cancer screening has not been done officially, had a poct occult blood in office in 2022, is agreeable to cologuard    Review of Systems   Constitutional:  Positive for fatigue. Negative for activity change, appetite change and weight loss.   HENT:  Positive for ear discharge, ear pain and postnasal drip.         Right sided facial and ear discomfort and b/l ear drainage depending on which side she is laying on   Respiratory: Negative.  Negative for shortness of breath.    Cardiovascular:  Positive for palpitations. Negative for chest pain.        Has had a few palpitations in the past couple of days, is why she takes the atenolol   Endocrine: Positive for polydipsia and polyuria.   Genitourinary:  Positive for frequency.   Allergic/Immunologic: Positive for environmental allergies.   Neurological: Negative.  Negative for tremors, speech difficulty and headaches.   Psychiatric/Behavioral:  Negative for confusion.      Objective   Vital Signs:  /74   Pulse 66   Ht 177.8 cm (70\")   Wt 123 kg (271 lb 3.2 oz)   SpO2 97%   BMI 38.91 kg/m²     BP Readings from Last 3 Encounters:   03/20/25 144/74   01/08/24 150/80   08/30/23 142/74        Wt Readings from Last 3 Encounters:   03/20/25 123 kg (271 lb 3.2 oz)   01/08/24 117 kg (258 lb 6.1 oz)   08/30/23 122 kg (270 lb)        Class 2 Severe Obesity (BMI >=35 and <=39.9). " Obesity-related health conditions include the following: hypertension and diabetes mellitus. Obesity is unchanged. BMI is is above average; BMI management plan is completed. We discussed portion control and increasing exercise.      Physical Exam  Vitals reviewed.   Constitutional:       Appearance: Normal appearance. She is obese.   HENT:      Right Ear: Tympanic membrane, ear canal and external ear normal.      Left Ear: Tympanic membrane, ear canal and external ear normal.      Mouth/Throat:      Mouth: Mucous membranes are moist.      Pharynx: Oropharynx is clear.   Neck:      Vascular: No carotid bruit.   Cardiovascular:      Rate and Rhythm: Normal rate and regular rhythm.      Heart sounds: Normal heart sounds.   Pulmonary:      Effort: Pulmonary effort is normal.      Breath sounds: Normal breath sounds.   Musculoskeletal:      Cervical back: Neck supple.      Right lower leg: No edema.      Left lower leg: No edema.   Skin:     General: Skin is warm.   Neurological:      Mental Status: She is alert and oriented to person, place, and time.        Result Review :                 Assessment and Plan    Diagnoses and all orders for this visit:    1. Type 2 diabetes mellitus without complication, with long-term current use of insulin (Primary)  -     Hemoglobin A1c; Future  -     Microalbumin / Creatinine Urine Ratio - Urine, Clean Catch  -     Comprehensive Metabolic Panel  -     metFORMIN (GLUCOPHAGE) 1000 MG tablet; Take 1 tablet by mouth 2 (Two) Times a Day.  Dispense: 180 tablet; Refill: 3    2. Encounter for screening mammogram for malignant neoplasm of breast  -     Mammo Screening Digital Tomosynthesis Bilateral With CAD; Future    3. Screen for colon cancer  -     Cologuard - Stool, Per Rectum; Future    4. H/O iron deficiency  -     Iron and TIBC; Future  -     CBC w AUTO Differential; Future    5. Primary hypertension  -     atenolol (TENORMIN) 25 MG tablet; Take 1 tablet by mouth Every Evening.   Dispense: 90 tablet; Refill: 3  -     lisinopril (PRINIVIL,ZESTRIL) 20 MG tablet; Take 1 tablet by mouth Daily.  Dispense: 90 tablet; Refill: 3    6. Anxiety  -     sertraline (ZOLOFT) 100 MG tablet; Take 2 tablets by mouth Daily.  Dispense: 180 tablet; Refill: 3           Follow Up   Return in about 3 months (around 6/20/2025) for Recheck DM, annual physical.  Patient was given instructions and counseling regarding her condition or for health maintenance advice. Please see specific information pulled into the AVS if appropriate.

## 2025-07-01 DIAGNOSIS — Z79.4 TYPE 2 DIABETES MELLITUS WITHOUT COMPLICATION, WITH LONG-TERM CURRENT USE OF INSULIN: ICD-10-CM

## 2025-07-01 DIAGNOSIS — E11.9 TYPE 2 DIABETES MELLITUS WITHOUT COMPLICATION, WITH LONG-TERM CURRENT USE OF INSULIN: ICD-10-CM
